# Patient Record
Sex: FEMALE | Race: WHITE | NOT HISPANIC OR LATINO | Employment: FULL TIME | URBAN - METROPOLITAN AREA
[De-identification: names, ages, dates, MRNs, and addresses within clinical notes are randomized per-mention and may not be internally consistent; named-entity substitution may affect disease eponyms.]

---

## 2017-07-10 ENCOUNTER — APPOINTMENT (OUTPATIENT)
Dept: WOMENS IMAGING | Facility: HOSPITAL | Age: 42
End: 2017-07-10

## 2017-07-10 PROCEDURE — 77067 SCR MAMMO BI INCL CAD: CPT | Performed by: RADIOLOGY

## 2019-04-15 ENCOUNTER — APPOINTMENT (OUTPATIENT)
Dept: WOMENS IMAGING | Facility: HOSPITAL | Age: 44
End: 2019-04-15

## 2019-04-15 PROCEDURE — 77063 BREAST TOMOSYNTHESIS BI: CPT | Performed by: RADIOLOGY

## 2019-04-15 PROCEDURE — 77067 SCR MAMMO BI INCL CAD: CPT | Performed by: RADIOLOGY

## 2019-10-29 ENCOUNTER — TRANSCRIBE ORDERS (OUTPATIENT)
Dept: ADMINISTRATIVE | Facility: HOSPITAL | Age: 44
End: 2019-10-29

## 2019-10-29 DIAGNOSIS — E07.89 HEMORRHAGE AND INFARCTION OF THYROID: Primary | ICD-10-CM

## 2020-06-01 ENCOUNTER — APPOINTMENT (OUTPATIENT)
Dept: WOMENS IMAGING | Facility: HOSPITAL | Age: 45
End: 2020-06-01

## 2020-06-01 PROCEDURE — 77067 SCR MAMMO BI INCL CAD: CPT | Performed by: RADIOLOGY

## 2020-06-01 PROCEDURE — 77063 BREAST TOMOSYNTHESIS BI: CPT | Performed by: RADIOLOGY

## 2020-10-13 ENCOUNTER — HOSPITAL ENCOUNTER (OUTPATIENT)
Dept: ULTRASOUND IMAGING | Facility: HOSPITAL | Age: 45
Discharge: HOME | End: 2020-10-13
Admitting: FAMILY MEDICINE

## 2020-10-13 DIAGNOSIS — E07.89 HEMORRHAGE AND INFARCTION OF THYROID: ICD-10-CM

## 2020-10-13 PROCEDURE — 76536 US EXAM OF HEAD AND NECK: CPT

## 2021-10-28 ENCOUNTER — TRANSCRIBE ORDERS (OUTPATIENT)
Dept: ADMINISTRATIVE | Facility: HOSPITAL | Age: 46
End: 2021-10-28

## 2021-10-28 DIAGNOSIS — E07.89 HEMORRHAGE AND INFARCTION OF THYROID: Primary | ICD-10-CM

## 2021-11-02 ENCOUNTER — APPOINTMENT (OUTPATIENT)
Dept: WOMENS IMAGING | Facility: HOSPITAL | Age: 46
End: 2021-11-02

## 2021-11-02 PROCEDURE — 77063 BREAST TOMOSYNTHESIS BI: CPT | Performed by: RADIOLOGY

## 2021-11-02 PROCEDURE — 77067 SCR MAMMO BI INCL CAD: CPT | Performed by: RADIOLOGY

## 2021-11-18 ENCOUNTER — HOSPITAL ENCOUNTER (OUTPATIENT)
Dept: ULTRASOUND IMAGING | Facility: HOSPITAL | Age: 46
Discharge: HOME | End: 2021-11-18
Admitting: FAMILY MEDICINE

## 2021-11-18 DIAGNOSIS — E07.89 HEMORRHAGE AND INFARCTION OF THYROID: ICD-10-CM

## 2021-11-18 PROCEDURE — 76536 US EXAM OF HEAD AND NECK: CPT

## 2022-04-12 ENCOUNTER — TRANSCRIBE ORDERS (OUTPATIENT)
Dept: ADMINISTRATIVE | Facility: HOSPITAL | Age: 47
End: 2022-04-12

## 2022-04-12 DIAGNOSIS — E07.89 OTHER SPECIFIED DISORDERS OF THYROID: Primary | ICD-10-CM

## 2022-04-19 ENCOUNTER — HOSPITAL ENCOUNTER (OUTPATIENT)
Dept: ULTRASOUND IMAGING | Facility: HOSPITAL | Age: 47
Discharge: HOME | End: 2022-04-19
Admitting: FAMILY MEDICINE

## 2022-04-19 DIAGNOSIS — E07.89 OTHER SPECIFIED DISORDERS OF THYROID: ICD-10-CM

## 2022-04-19 PROCEDURE — 76536 US EXAM OF HEAD AND NECK: CPT

## 2022-12-13 ENCOUNTER — APPOINTMENT (OUTPATIENT)
Dept: WOMENS IMAGING | Facility: HOSPITAL | Age: 47
End: 2022-12-13

## 2022-12-13 PROCEDURE — 77067 SCR MAMMO BI INCL CAD: CPT | Performed by: RADIOLOGY

## 2022-12-13 PROCEDURE — 77063 BREAST TOMOSYNTHESIS BI: CPT | Performed by: RADIOLOGY

## 2023-01-19 ENCOUNTER — APPOINTMENT (OUTPATIENT)
Dept: WOMENS IMAGING | Facility: HOSPITAL | Age: 48
End: 2023-01-19
Payer: COMMERCIAL

## 2023-01-19 PROCEDURE — 77065 DX MAMMO INCL CAD UNI: CPT | Performed by: RADIOLOGY

## 2023-01-19 PROCEDURE — 77061 BREAST TOMOSYNTHESIS UNI: CPT | Performed by: RADIOLOGY

## 2023-01-19 PROCEDURE — 76642 ULTRASOUND BREAST LIMITED: CPT | Performed by: RADIOLOGY

## 2023-01-19 PROCEDURE — G0279 TOMOSYNTHESIS, MAMMO: HCPCS | Performed by: RADIOLOGY

## 2023-01-24 ENCOUNTER — TRANSCRIBE ORDERS (OUTPATIENT)
Dept: GENERAL RADIOLOGY | Facility: HOSPITAL | Age: 48
End: 2023-01-24
Payer: COMMERCIAL

## 2023-02-20 ENCOUNTER — APPOINTMENT (OUTPATIENT)
Dept: WOMENS IMAGING | Facility: HOSPITAL | Age: 48
End: 2023-02-20
Payer: COMMERCIAL

## 2023-02-20 PROCEDURE — 19082 BX BREAST ADD LESION STRTCTC: CPT | Performed by: RADIOLOGY

## 2023-02-20 PROCEDURE — A4648 IMPLANTABLE TISSUE MARKER: HCPCS | Performed by: RADIOLOGY

## 2023-02-20 PROCEDURE — 19083 BX BREAST 1ST LESION US IMAG: CPT | Performed by: RADIOLOGY

## 2023-02-20 PROCEDURE — 19081 BX BREAST 1ST LESION STRTCTC: CPT | Performed by: RADIOLOGY

## 2023-02-21 DIAGNOSIS — C50.919 MALIGNANT NEOPLASM OF FEMALE BREAST, UNSPECIFIED ESTROGEN RECEPTOR STATUS, UNSPECIFIED LATERALITY, UNSPECIFIED SITE OF BREAST: Primary | ICD-10-CM

## 2023-02-22 ENCOUNTER — TELEPHONE (OUTPATIENT)
Dept: SURGERY | Facility: CLINIC | Age: 48
End: 2023-02-22
Payer: COMMERCIAL

## 2023-02-22 DIAGNOSIS — C50.912 MALIGNANT NEOPLASM OF LEFT FEMALE BREAST, UNSPECIFIED ESTROGEN RECEPTOR STATUS, UNSPECIFIED SITE OF BREAST: Primary | ICD-10-CM

## 2023-02-22 NOTE — TELEPHONE ENCOUNTER
ATTEMPTED TO CONTACT PT REGARDING MRI BREAST SCHEDULED ON 3/2 @ 5:45 PM, ARRIVAL 5:15 PM FOR [facility], [place], operated by [facility]. PT IS TO WEAR NO METALS FOR THE APPT. NEED TO GET PT'S WT. VM WAS FULL, UNABLE TO LM. WILL TRY AGAIN LATER.

## 2023-02-23 ENCOUNTER — TELEPHONE (OUTPATIENT)
Dept: SURGERY | Facility: CLINIC | Age: 48
End: 2023-02-23
Payer: COMMERCIAL

## 2023-02-23 NOTE — TELEPHONE ENCOUNTER
SPOKE TO PT REGARDING MRI BREAST SCHEDULED ON 3/2 @ 5:45 PM, ARRIVAL 5:15 PM FOR [facility]. INSTRUCTED PT TO WEAR NO METALS TO THE APPT.

## 2023-02-28 ENCOUNTER — OFFICE VISIT (OUTPATIENT)
Dept: SURGERY | Facility: CLINIC | Age: 48
End: 2023-02-28
Payer: COMMERCIAL

## 2023-02-28 ENCOUNTER — PATIENT OUTREACH (OUTPATIENT)
Dept: OTHER | Facility: HOSPITAL | Age: 48
End: 2023-02-28
Payer: COMMERCIAL

## 2023-02-28 VITALS — BODY MASS INDEX: 23.22 KG/M2 | WEIGHT: 136 LBS | HEIGHT: 64 IN

## 2023-02-28 DIAGNOSIS — D05.12 DUCTAL CARCINOMA IN SITU (DCIS) OF LEFT BREAST: Primary | ICD-10-CM

## 2023-02-28 PROCEDURE — 99204 OFFICE O/P NEW MOD 45 MIN: CPT | Performed by: STUDENT IN AN ORGANIZED HEALTH CARE EDUCATION/TRAINING PROGRAM

## 2023-02-28 RX ORDER — OMEPRAZOLE 20 MG/1
20 CAPSULE, DELAYED RELEASE ORAL DAILY
COMMUNITY
Start: 2023-01-30

## 2023-02-28 RX ORDER — LEVOTHYROXINE SODIUM 0.2 MG/1
200 TABLET ORAL DAILY
COMMUNITY
Start: 2023-02-25

## 2023-02-28 RX ORDER — LINACLOTIDE 290 UG/1
290 CAPSULE, GELATIN COATED ORAL DAILY
COMMUNITY
Start: 2022-12-06

## 2023-02-28 RX ORDER — ERGOCALCIFEROL 1.25 MG/1
50000 CAPSULE ORAL
COMMUNITY
Start: 2023-02-23

## 2023-02-28 RX ORDER — DEXTROAMPHETAMINE SACCHARATE, AMPHETAMINE ASPARTATE MONOHYDRATE, DEXTROAMPHETAMINE SULFATE AND AMPHETAMINE SULFATE 5; 5; 5; 5 MG/1; MG/1; MG/1; MG/1
20 CAPSULE, EXTENDED RELEASE ORAL EVERY MORNING
COMMUNITY
Start: 2023-02-05

## 2023-02-28 NOTE — PROGRESS NOTES
Referral received from Dr. Hickman's office. Met Ms. Zapata, her  and best friend during her surgery consult. I introduced myself and navigational services. She has a good understanding of her pathology and treatment options presented to her by Dr. Hickman and was able to verbalize teach back. After the consult she is leaning toward having a lumpectomy. She is comfortable with this plan and has no questions or concerns following the consult.      She stated she has a wonderful support system with her family and friends. She stated she feels comfortable talking to them about needs or issues.      She stated she has no financial or transportation concerns at this time. She has no resource needs or ongoing concerns at this time.      She stated she is doing ok emotionally at this time. We discussed we have support options if the need arises. She was thankful for the information.      We discussed integrative therapies and other [facility]. She received a navigation folder with the following information:     [facility], Cancer and Restorative Exercise (CARE), [employer] Exercise program, Guide for the Newly Diagnosed, [facility], [facility], Massage Therapy, Reiki Therapy, [employer] [place], Cancer Nutrition, [facility] and Survivorship [facility].     She verbalized appreciation for navigational services and she has my contact information and will call with any questions that arise.

## 2023-03-01 ENCOUNTER — PREP FOR SURGERY (OUTPATIENT)
Dept: OTHER | Facility: HOSPITAL | Age: 48
End: 2023-03-01
Payer: COMMERCIAL

## 2023-03-01 DIAGNOSIS — D05.12 DUCTAL CARCINOMA IN SITU (DCIS) OF LEFT BREAST: Primary | ICD-10-CM

## 2023-03-01 RX ORDER — DIAZEPAM 5 MG/1
5 TABLET ORAL ONCE
Status: CANCELLED | OUTPATIENT
Start: 2023-03-24 | End: 2023-03-01

## 2023-03-01 RX ORDER — CEFAZOLIN SODIUM 2 G/100ML
2 INJECTION, SOLUTION INTRAVENOUS ONCE
Status: CANCELLED | OUTPATIENT
Start: 2023-03-24 | End: 2023-03-01

## 2023-03-02 ENCOUNTER — HOSPITAL ENCOUNTER (OUTPATIENT)
Dept: MRI IMAGING | Facility: HOSPITAL | Age: 48
Discharge: HOME | End: 2023-03-02
Admitting: STUDENT IN AN ORGANIZED HEALTH CARE EDUCATION/TRAINING PROGRAM
Payer: COMMERCIAL

## 2023-03-02 DIAGNOSIS — C50.912 MALIGNANT NEOPLASM OF LEFT FEMALE BREAST, UNSPECIFIED ESTROGEN RECEPTOR STATUS, UNSPECIFIED SITE OF BREAST: ICD-10-CM

## 2023-03-02 PROCEDURE — A9577 INJ MULTIHANCE: HCPCS | Performed by: STUDENT IN AN ORGANIZED HEALTH CARE EDUCATION/TRAINING PROGRAM

## 2023-03-02 PROCEDURE — 0 GADOBENATE DIMEGLUMINE 529 MG/ML SOLUTION: Performed by: STUDENT IN AN ORGANIZED HEALTH CARE EDUCATION/TRAINING PROGRAM

## 2023-03-02 PROCEDURE — 77049 MRI BREAST C-+ W/CAD BI: CPT

## 2023-03-02 RX ADMIN — GADOBENATE DIMEGLUMINE 12 ML: 529 INJECTION, SOLUTION INTRAVENOUS at 17:54

## 2023-03-06 ENCOUNTER — TELEPHONE (OUTPATIENT)
Dept: SURGERY | Facility: CLINIC | Age: 48
End: 2023-03-06
Payer: COMMERCIAL

## 2023-03-06 NOTE — TELEPHONE ENCOUNTER
Called and left voicemail. No new findings. Ok to proceed with left breast wire localized lumpectomy. Will discuss when she calls back.    ER/IA-, Ki67 60%    IMPRESSION:  1. Biopsy-proven site of DCIS in the posterior one-third upper-outer quadrant of the left breast represented by the hourglass-shaped metallic clip. No suspicious enhancement is seen at this location or in other areas of the left breast. There is no evidence for left axillary adenopathy. If breast conservation therapy is desired then surgical excision of any remaining suspicious microcalcifications is recommended.  2. There are no findings suspicious for malignancy in the right breast.  BI-RADS Category 6: Known biopsy-proven malignancy.    Janny Hickman MD

## 2023-03-08 ENCOUNTER — TELEPHONE (OUTPATIENT)
Dept: SURGERY | Facility: CLINIC | Age: 48
End: 2023-03-08
Payer: COMMERCIAL

## 2023-03-10 ENCOUNTER — TELEPHONE (OUTPATIENT)
Dept: SURGERY | Facility: CLINIC | Age: 48
End: 2023-03-10
Payer: COMMERCIAL

## 2023-03-10 NOTE — TELEPHONE ENCOUNTER
Called to discuss MRI results.  No answer and no voicemail set up.  We will try again later.    Janny Hickman MD

## 2023-03-13 ENCOUNTER — TELEPHONE (OUTPATIENT)
Dept: SURGERY | Facility: CLINIC | Age: 48
End: 2023-03-13
Payer: COMMERCIAL

## 2023-03-16 ENCOUNTER — PATIENT OUTREACH (OUTPATIENT)
Dept: OTHER | Facility: HOSPITAL | Age: 48
End: 2023-03-16
Payer: COMMERCIAL

## 2023-03-16 NOTE — PROGRESS NOTES
Pt emailed questions about pre-op appts, and what to expect post op. Responded to her through email.

## 2023-03-17 ENCOUNTER — PRE-ADMISSION TESTING (OUTPATIENT)
Dept: PREADMISSION TESTING | Facility: HOSPITAL | Age: 48
End: 2023-03-17
Payer: COMMERCIAL

## 2023-03-17 VITALS
OXYGEN SATURATION: 100 % | HEART RATE: 82 BPM | WEIGHT: 139.6 LBS | SYSTOLIC BLOOD PRESSURE: 98 MMHG | DIASTOLIC BLOOD PRESSURE: 68 MMHG | BODY MASS INDEX: 23.83 KG/M2 | TEMPERATURE: 97.4 F | RESPIRATION RATE: 16 BRPM | HEIGHT: 64 IN

## 2023-03-17 LAB
ANION GAP SERPL CALCULATED.3IONS-SCNC: 6.2 MMOL/L (ref 5–15)
BUN SERPL-MCNC: 16 MG/DL (ref 6–20)
BUN/CREAT SERPL: 20.8 (ref 7–25)
CALCIUM SPEC-SCNC: 9.7 MG/DL (ref 8.6–10.5)
CHLORIDE SERPL-SCNC: 104 MMOL/L (ref 98–107)
CO2 SERPL-SCNC: 29.8 MMOL/L (ref 22–29)
CREAT SERPL-MCNC: 0.77 MG/DL (ref 0.57–1)
DEPRECATED RDW RBC AUTO: 37.8 FL (ref 37–54)
EGFRCR SERPLBLD CKD-EPI 2021: 95.3 ML/MIN/1.73
ERYTHROCYTE [DISTWIDTH] IN BLOOD BY AUTOMATED COUNT: 11.3 % (ref 12.3–15.4)
GLUCOSE SERPL-MCNC: 85 MG/DL (ref 65–99)
HCG SERPL QL: NEGATIVE
HCT VFR BLD AUTO: 39.7 % (ref 34–46.6)
HGB BLD-MCNC: 13.7 G/DL (ref 12–15.9)
MCH RBC QN AUTO: 31.3 PG (ref 26.6–33)
MCHC RBC AUTO-ENTMCNC: 34.5 G/DL (ref 31.5–35.7)
MCV RBC AUTO: 90.6 FL (ref 79–97)
PLATELET # BLD AUTO: 263 10*3/MM3 (ref 140–450)
PMV BLD AUTO: 10 FL (ref 6–12)
POTASSIUM SERPL-SCNC: 4.4 MMOL/L (ref 3.5–5.2)
RBC # BLD AUTO: 4.38 10*6/MM3 (ref 3.77–5.28)
SODIUM SERPL-SCNC: 140 MMOL/L (ref 136–145)
WBC NRBC COR # BLD: 4.94 10*3/MM3 (ref 3.4–10.8)

## 2023-03-17 PROCEDURE — 85027 COMPLETE CBC AUTOMATED: CPT

## 2023-03-17 PROCEDURE — 36415 COLL VENOUS BLD VENIPUNCTURE: CPT

## 2023-03-17 PROCEDURE — 80048 BASIC METABOLIC PNL TOTAL CA: CPT

## 2023-03-17 PROCEDURE — 84703 CHORIONIC GONADOTROPIN ASSAY: CPT

## 2023-03-17 RX ORDER — CHLORHEXIDINE GLUCONATE 500 MG/1
CLOTH TOPICAL
COMMUNITY

## 2023-03-17 RX ORDER — SPIRONOLACTONE 100 MG/1
100 TABLET, FILM COATED ORAL DAILY
COMMUNITY
Start: 2023-03-01

## 2023-03-17 NOTE — DISCHARGE INSTRUCTIONS
Take the following medications the morning of surgery:  LEVOTHYROXINE AND OMEPRAZOLE    HOLD ADDERALL 48 HOURS PRIOR TO OR     ARRIVAL TIME 0930 ON 3/24/23      If you are on prescription narcotic pain medication to control your pain you may also take that medication the morning of surgery.    General Instructions:  Do not eat solid food after midnight the night before surgery.  You may drink clear liquids day of surgery but must stop at least one hour before your hospital arrival time.  It is beneficial for you to have a clear drink that contains carbohydrates the day of surgery.  We suggest a 12 to 20 ounce bottle of Gatorade or Powerade for non-diabetic patients or a 12 to 20 ounce bottle of G2 or Powerade Zero for diabetic patients. (Pediatric patients, are not advised to drink a 12 to 20 ounce carbohydrate drink)    Clear liquids are liquids you can see through.  Nothing red in color.     Plain water                               Sports drinks  Sodas                                   Gelatin (Jell-O)  Fruit juices without pulp such as white grape juice and apple juice  Popsicles that contain no fruit or yogurt  Tea or coffee (no cream or milk added)  Gatorade / Powerade  G2 / Powerade Zero    Infants may have breast milk up to four hours before surgery.  Infants drinking formula may drink formula up to six hours before surgery.   Patients who avoid smoking, chewing tobacco and alcohol for 4 weeks prior to surgery have a reduced risk of post-operative complications.  Quit smoking as many days before surgery as you can.  Do not smoke, use chewing tobacco or drink alcohol the day of surgery.   If applicable bring your C-PAP/ BI-PAP machine.  Bring any papers given to you in the doctor’s office.  Wear clean comfortable clothes.  Do not wear contact lenses, false eyelashes or make-up.  Bring a case for your glasses.   Bring crutches or walker if applicable.  Remove all piercings.  Leave jewelry and any other  valuables at home.  Hair extensions with metal clips must be removed prior to surgery.  The Pre-Admission Testing nurse will instruct you to bring medications if unable to obtain an accurate list in Pre-Admission Testing.            Preventing a Surgical Site Infection:  For 2 to 3 days before surgery, avoid shaving with a razor because the razor can irritate skin and make it easier to develop an infection.    Any areas of open skin can increase the risk of a post-operative wound infection by allowing bacteria to enter and travel throughout the body.  Notify your surgeon if you have any skin wounds / rashes even if it is not near the expected surgical site.  The area will need assessed to determine if surgery should be delayed until it is healed.  The night prior to surgery shower using a fresh bar of anti-bacterial soap (such as Dial) and clean washcloth.  Sleep in a clean bed with clean clothing.  Do not allow pets to sleep with you.  Shower on the morning of surgery using a fresh bar of anti-bacterial soap (such as Dial) and clean washcloth.  Dry with a clean towel and dress in clean clothing.  Ask your surgeon if you will be receiving antibiotics prior to surgery.  Make sure you, your family, and all healthcare providers clean their hands with soap and water or an alcohol based hand  before caring for you or your wound.    Day of surgery:  Your arrival time is approximately two hours before your scheduled surgery time.  Upon arrival, a Pre-op nurse and Anesthesiologist will review your health history, obtain vital signs, and answer questions you may have.  The only belongings needed at this time will be a list of your home medications and if applicable your C-PAP/BI-PAP machine.  A Pre-op nurse will start an IV and you may receive medication in preparation for surgery, including something to help you relax.     Please be aware that surgery does come with discomfort.  We want to make every effort to  control your discomfort so please discuss any uncontrolled symptoms with your nurse.   Your doctor will most likely have prescribed pain medications.      If you are going home after surgery you will receive individualized written care instructions before being discharged.  A responsible adult must drive you to and from the hospital on the day of your surgery and stay with you for 24 hours.  Discharge prescriptions can be filled by the hospital pharmacy during regular pharmacy hours.  If you are having surgery late in the day/evening your prescription may be e-prescribed to your pharmacy.  Please verify your pharmacy hours or chose a 24 hour pharmacy to avoid not having access to your prescription because your pharmacy has closed for the day.    If you are staying overnight following surgery, you will be transported to your hospital room following the recovery period.  [facility] has all private rooms.    If you have any questions please call Pre-Admission Testing at [phone].  Deductibles and co-payments are collected on the day of service. Please be prepared to pay the required co-pay, deductible or deposit on the day of service as defined by your plan.    Call your surgeon immediately if you experience any of the following symptoms:  Sore Throat  Shortness of Breath or difficulty breathing  Cough  Chills  Body soreness or muscle pain  Headache  Fever  New loss of taste or smell  Do not arrive for your surgery ill.  Your procedure will need to be rescheduled to another time.  You will need to call your physician before the day of surgery to avoid any unnecessary exposure to hospital staff as well as other patients.

## 2023-03-20 ENCOUNTER — PREP FOR SURGERY (OUTPATIENT)
Dept: OTHER | Facility: HOSPITAL | Age: 48
End: 2023-03-20
Payer: COMMERCIAL

## 2023-03-24 ENCOUNTER — ANESTHESIA EVENT (OUTPATIENT)
Dept: PERIOP | Facility: HOSPITAL | Age: 48
End: 2023-03-24
Payer: COMMERCIAL

## 2023-03-24 ENCOUNTER — ANESTHESIA (OUTPATIENT)
Dept: PERIOP | Facility: HOSPITAL | Age: 48
End: 2023-03-24
Payer: COMMERCIAL

## 2023-03-24 ENCOUNTER — HOSPITAL ENCOUNTER (OUTPATIENT)
Facility: HOSPITAL | Age: 48
Setting detail: HOSPITAL OUTPATIENT SURGERY
Discharge: HOME | End: 2023-03-24
Attending: STUDENT IN AN ORGANIZED HEALTH CARE EDUCATION/TRAINING PROGRAM | Admitting: STUDENT IN AN ORGANIZED HEALTH CARE EDUCATION/TRAINING PROGRAM
Payer: COMMERCIAL

## 2023-03-24 ENCOUNTER — HOSPITAL ENCOUNTER (OUTPATIENT)
Dept: MAMMOGRAPHY | Facility: HOSPITAL | Age: 48
Discharge: HOME | End: 2023-03-24
Payer: COMMERCIAL

## 2023-03-24 ENCOUNTER — APPOINTMENT (OUTPATIENT)
Dept: GENERAL RADIOLOGY | Facility: HOSPITAL | Age: 48
End: 2023-03-24
Payer: COMMERCIAL

## 2023-03-24 VITALS
HEART RATE: 90 BPM | RESPIRATION RATE: 16 BRPM | TEMPERATURE: 98.2 F | OXYGEN SATURATION: 100 % | HEIGHT: 64 IN | SYSTOLIC BLOOD PRESSURE: 104 MMHG | BODY MASS INDEX: 23.96 KG/M2 | DIASTOLIC BLOOD PRESSURE: 67 MMHG

## 2023-03-24 DIAGNOSIS — D05.12 DUCTAL CARCINOMA IN SITU (DCIS) OF LEFT BREAST: ICD-10-CM

## 2023-03-24 DIAGNOSIS — D05.12 DUCTAL CARCINOMA IN SITU (DCIS) OF LEFT BREAST: Primary | ICD-10-CM

## 2023-03-24 LAB
B-HCG UR QL: NEGATIVE
EXPIRATION DATE: NORMAL
INTERNAL NEGATIVE CONTROL: NEGATIVE
INTERNAL POSITIVE CONTROL: POSITIVE
Lab: NORMAL

## 2023-03-24 PROCEDURE — 81025 URINE PREGNANCY TEST: CPT | Performed by: STUDENT IN AN ORGANIZED HEALTH CARE EDUCATION/TRAINING PROGRAM

## 2023-03-24 PROCEDURE — 19301 PARTIAL MASTECTOMY: CPT | Performed by: STUDENT IN AN ORGANIZED HEALTH CARE EDUCATION/TRAINING PROGRAM

## 2023-03-24 PROCEDURE — 25010000002 ONDANSETRON PER 1 MG: Performed by: NURSE ANESTHETIST, CERTIFIED REGISTERED

## 2023-03-24 PROCEDURE — 25010000002 CEFAZOLIN IN DEXTROSE 2-4 GM/100ML-% SOLUTION: Performed by: STUDENT IN AN ORGANIZED HEALTH CARE EDUCATION/TRAINING PROGRAM

## 2023-03-24 PROCEDURE — 25010000002 DEXAMETHASONE SODIUM PHOSPHATE 20 MG/5ML SOLUTION: Performed by: NURSE ANESTHETIST, CERTIFIED REGISTERED

## 2023-03-24 PROCEDURE — 25010000002 KETOROLAC TROMETHAMINE PER 15 MG: Performed by: NURSE ANESTHETIST, CERTIFIED REGISTERED

## 2023-03-24 PROCEDURE — C1819 TISSUE LOCALIZATION-EXCISION: HCPCS

## 2023-03-24 PROCEDURE — 25010000002 DROPERIDOL PER 5 MG: Performed by: NURSE ANESTHETIST, CERTIFIED REGISTERED

## 2023-03-24 PROCEDURE — 76098 X-RAY EXAM SURGICAL SPECIMEN: CPT

## 2023-03-24 PROCEDURE — 25010000002 PROPOFOL 10 MG/ML EMULSION: Performed by: NURSE ANESTHETIST, CERTIFIED REGISTERED

## 2023-03-24 PROCEDURE — 0 LIDOCAINE 1 % SOLUTION: Performed by: STUDENT IN AN ORGANIZED HEALTH CARE EDUCATION/TRAINING PROGRAM

## 2023-03-24 PROCEDURE — 25010000002 HYDROMORPHONE PER 4 MG: Performed by: NURSE ANESTHETIST, CERTIFIED REGISTERED

## 2023-03-24 PROCEDURE — 25010000002 FENTANYL CITRATE (PF) 50 MCG/ML SOLUTION: Performed by: NURSE ANESTHETIST, CERTIFIED REGISTERED

## 2023-03-24 PROCEDURE — 25010000002 MIDAZOLAM PER 1 MG: Performed by: STUDENT IN AN ORGANIZED HEALTH CARE EDUCATION/TRAINING PROGRAM

## 2023-03-24 PROCEDURE — 88307 TISSUE EXAM BY PATHOLOGIST: CPT | Performed by: STUDENT IN AN ORGANIZED HEALTH CARE EDUCATION/TRAINING PROGRAM

## 2023-03-24 DEVICE — LIGACLIP MCA MULTIPLE CLIP APPLIERS, 20 MEDIUM CLIPS
Type: IMPLANTABLE DEVICE | Site: BREAST | Status: FUNCTIONAL
Brand: LIGACLIP

## 2023-03-24 RX ORDER — HYDRALAZINE HYDROCHLORIDE 20 MG/ML
5 INJECTION INTRAMUSCULAR; INTRAVENOUS
Status: DISCONTINUED | OUTPATIENT
Start: 2023-03-24 | End: 2023-03-24 | Stop reason: HOSPADM

## 2023-03-24 RX ORDER — EPHEDRINE SULFATE 50 MG/ML
5 INJECTION, SOLUTION INTRAVENOUS ONCE AS NEEDED
Status: DISCONTINUED | OUTPATIENT
Start: 2023-03-24 | End: 2023-03-24 | Stop reason: HOSPADM

## 2023-03-24 RX ORDER — DROPERIDOL 2.5 MG/ML
0.62 INJECTION, SOLUTION INTRAMUSCULAR; INTRAVENOUS
Status: DISCONTINUED | OUTPATIENT
Start: 2023-03-24 | End: 2023-03-24 | Stop reason: HOSPADM

## 2023-03-24 RX ORDER — MIDAZOLAM HYDROCHLORIDE 1 MG/ML
1 INJECTION INTRAMUSCULAR; INTRAVENOUS
Status: COMPLETED | OUTPATIENT
Start: 2023-03-24 | End: 2023-03-24

## 2023-03-24 RX ORDER — PROPOFOL 10 MG/ML
VIAL (ML) INTRAVENOUS AS NEEDED
Status: DISCONTINUED | OUTPATIENT
Start: 2023-03-24 | End: 2023-03-24 | Stop reason: SURG

## 2023-03-24 RX ORDER — DIPHENHYDRAMINE HYDROCHLORIDE 50 MG/ML
12.5 INJECTION INTRAMUSCULAR; INTRAVENOUS
Status: DISCONTINUED | OUTPATIENT
Start: 2023-03-24 | End: 2023-03-24 | Stop reason: HOSPADM

## 2023-03-24 RX ORDER — NALOXONE HCL 0.4 MG/ML
0.2 VIAL (ML) INJECTION AS NEEDED
Status: DISCONTINUED | OUTPATIENT
Start: 2023-03-24 | End: 2023-03-24 | Stop reason: HOSPADM

## 2023-03-24 RX ORDER — HYDROCODONE BITARTRATE AND ACETAMINOPHEN 7.5; 325 MG/1; MG/1
1 TABLET ORAL ONCE AS NEEDED
Status: DISCONTINUED | OUTPATIENT
Start: 2023-03-24 | End: 2023-03-24 | Stop reason: HOSPADM

## 2023-03-24 RX ORDER — SODIUM CHLORIDE 0.9 % (FLUSH) 0.9 %
3 SYRINGE (ML) INJECTION EVERY 12 HOURS SCHEDULED
Status: DISCONTINUED | OUTPATIENT
Start: 2023-03-24 | End: 2023-03-24 | Stop reason: HOSPADM

## 2023-03-24 RX ORDER — FENTANYL CITRATE 50 UG/ML
INJECTION, SOLUTION INTRAMUSCULAR; INTRAVENOUS AS NEEDED
Status: DISCONTINUED | OUTPATIENT
Start: 2023-03-24 | End: 2023-03-24 | Stop reason: SURG

## 2023-03-24 RX ORDER — LABETALOL HYDROCHLORIDE 5 MG/ML
5 INJECTION, SOLUTION INTRAVENOUS
Status: DISCONTINUED | OUTPATIENT
Start: 2023-03-24 | End: 2023-03-24 | Stop reason: HOSPADM

## 2023-03-24 RX ORDER — DIAZEPAM 5 MG/1
5 TABLET ORAL ONCE
Status: COMPLETED | OUTPATIENT
Start: 2023-03-24 | End: 2023-03-24

## 2023-03-24 RX ORDER — ACETAMINOPHEN 325 MG/1
650 TABLET ORAL EVERY 6 HOURS PRN
Status: CANCELLED | OUTPATIENT
Start: 2023-03-24

## 2023-03-24 RX ORDER — BUPIVACAINE HYDROCHLORIDE AND EPINEPHRINE 5; 5 MG/ML; UG/ML
INJECTION, SOLUTION PERINEURAL AS NEEDED
Status: DISCONTINUED | OUTPATIENT
Start: 2023-03-24 | End: 2023-03-24 | Stop reason: HOSPADM

## 2023-03-24 RX ORDER — OXYCODONE AND ACETAMINOPHEN 7.5; 325 MG/1; MG/1
1 TABLET ORAL EVERY 4 HOURS PRN
Status: DISCONTINUED | OUTPATIENT
Start: 2023-03-24 | End: 2023-03-24 | Stop reason: HOSPADM

## 2023-03-24 RX ORDER — MAGNESIUM HYDROXIDE 1200 MG/15ML
LIQUID ORAL AS NEEDED
Status: DISCONTINUED | OUTPATIENT
Start: 2023-03-24 | End: 2023-03-24 | Stop reason: HOSPADM

## 2023-03-24 RX ORDER — SODIUM CHLORIDE 0.9 % (FLUSH) 0.9 %
3-10 SYRINGE (ML) INJECTION AS NEEDED
Status: DISCONTINUED | OUTPATIENT
Start: 2023-03-24 | End: 2023-03-24 | Stop reason: HOSPADM

## 2023-03-24 RX ORDER — LIDOCAINE HYDROCHLORIDE 10 MG/ML
3 INJECTION, SOLUTION INFILTRATION; PERINEURAL ONCE
Status: COMPLETED | OUTPATIENT
Start: 2023-03-24 | End: 2023-03-24

## 2023-03-24 RX ORDER — LIDOCAINE HYDROCHLORIDE 20 MG/ML
INJECTION, SOLUTION INFILTRATION; PERINEURAL AS NEEDED
Status: DISCONTINUED | OUTPATIENT
Start: 2023-03-24 | End: 2023-03-24 | Stop reason: SURG

## 2023-03-24 RX ORDER — HYDROMORPHONE HYDROCHLORIDE 1 MG/ML
0.5 INJECTION, SOLUTION INTRAMUSCULAR; INTRAVENOUS; SUBCUTANEOUS
Status: DISCONTINUED | OUTPATIENT
Start: 2023-03-24 | End: 2023-03-24 | Stop reason: HOSPADM

## 2023-03-24 RX ORDER — ONDANSETRON 2 MG/ML
INJECTION INTRAMUSCULAR; INTRAVENOUS AS NEEDED
Status: DISCONTINUED | OUTPATIENT
Start: 2023-03-24 | End: 2023-03-24 | Stop reason: SURG

## 2023-03-24 RX ORDER — CEFAZOLIN SODIUM 2 G/100ML
2 INJECTION, SOLUTION INTRAVENOUS ONCE
Status: COMPLETED | OUTPATIENT
Start: 2023-03-24 | End: 2023-03-24

## 2023-03-24 RX ORDER — SCOLOPAMINE TRANSDERMAL SYSTEM 1 MG/1
1 PATCH, EXTENDED RELEASE TRANSDERMAL ONCE
Status: DISCONTINUED | OUTPATIENT
Start: 2023-03-24 | End: 2023-03-24 | Stop reason: HOSPADM

## 2023-03-24 RX ORDER — PROMETHAZINE HYDROCHLORIDE 25 MG/1
25 SUPPOSITORY RECTAL ONCE AS NEEDED
Status: DISCONTINUED | OUTPATIENT
Start: 2023-03-24 | End: 2023-03-24 | Stop reason: HOSPADM

## 2023-03-24 RX ORDER — SODIUM CHLORIDE, SODIUM LACTATE, POTASSIUM CHLORIDE, CALCIUM CHLORIDE 600; 310; 30; 20 MG/100ML; MG/100ML; MG/100ML; MG/100ML
9 INJECTION, SOLUTION INTRAVENOUS CONTINUOUS
Status: DISCONTINUED | OUTPATIENT
Start: 2023-03-24 | End: 2023-03-24 | Stop reason: HOSPADM

## 2023-03-24 RX ORDER — DEXAMETHASONE SODIUM PHOSPHATE 4 MG/ML
INJECTION, SOLUTION INTRA-ARTICULAR; INTRALESIONAL; INTRAMUSCULAR; INTRAVENOUS; SOFT TISSUE AS NEEDED
Status: DISCONTINUED | OUTPATIENT
Start: 2023-03-24 | End: 2023-03-24 | Stop reason: SURG

## 2023-03-24 RX ORDER — KETOROLAC TROMETHAMINE 30 MG/ML
INJECTION, SOLUTION INTRAMUSCULAR; INTRAVENOUS AS NEEDED
Status: DISCONTINUED | OUTPATIENT
Start: 2023-03-24 | End: 2023-03-24 | Stop reason: SURG

## 2023-03-24 RX ORDER — ACETAMINOPHEN 500 MG
1000 TABLET ORAL ONCE
Status: COMPLETED | OUTPATIENT
Start: 2023-03-24 | End: 2023-03-24

## 2023-03-24 RX ORDER — IPRATROPIUM BROMIDE AND ALBUTEROL SULFATE 2.5; .5 MG/3ML; MG/3ML
3 SOLUTION RESPIRATORY (INHALATION) ONCE AS NEEDED
Status: DISCONTINUED | OUTPATIENT
Start: 2023-03-24 | End: 2023-03-24 | Stop reason: HOSPADM

## 2023-03-24 RX ORDER — PROMETHAZINE HYDROCHLORIDE 25 MG/1
25 TABLET ORAL ONCE AS NEEDED
Status: DISCONTINUED | OUTPATIENT
Start: 2023-03-24 | End: 2023-03-24 | Stop reason: HOSPADM

## 2023-03-24 RX ORDER — LIDOCAINE HYDROCHLORIDE 10 MG/ML
0.5 INJECTION, SOLUTION EPIDURAL; INFILTRATION; INTRACAUDAL; PERINEURAL ONCE AS NEEDED
Status: DISCONTINUED | OUTPATIENT
Start: 2023-03-24 | End: 2023-03-24 | Stop reason: HOSPADM

## 2023-03-24 RX ORDER — TRAMADOL HYDROCHLORIDE 50 MG/1
50 TABLET ORAL EVERY 6 HOURS PRN
Qty: 8 TABLET | Refills: 0 | Status: SHIPPED | OUTPATIENT
Start: 2023-03-24 | End: 2024-03-23

## 2023-03-24 RX ORDER — FLUMAZENIL 0.1 MG/ML
0.2 INJECTION INTRAVENOUS AS NEEDED
Status: DISCONTINUED | OUTPATIENT
Start: 2023-03-24 | End: 2023-03-24 | Stop reason: HOSPADM

## 2023-03-24 RX ORDER — FENTANYL CITRATE 50 UG/ML
50 INJECTION, SOLUTION INTRAMUSCULAR; INTRAVENOUS
Status: DISCONTINUED | OUTPATIENT
Start: 2023-03-24 | End: 2023-03-24 | Stop reason: HOSPADM

## 2023-03-24 RX ORDER — ONDANSETRON 2 MG/ML
4 INJECTION INTRAMUSCULAR; INTRAVENOUS ONCE AS NEEDED
Status: COMPLETED | OUTPATIENT
Start: 2023-03-24 | End: 2023-03-24

## 2023-03-24 RX ADMIN — ONDANSETRON 4 MG: 2 INJECTION INTRAMUSCULAR; INTRAVENOUS at 14:54

## 2023-03-24 RX ADMIN — PROPOFOL 200 MG: 10 INJECTION, EMULSION INTRAVENOUS at 13:36

## 2023-03-24 RX ADMIN — DEXAMETHASONE SODIUM PHOSPHATE 8 MG: 4 INJECTION, SOLUTION INTRAMUSCULAR; INTRAVENOUS at 13:39

## 2023-03-24 RX ADMIN — HYDROMORPHONE HYDROCHLORIDE 0.5 MG: 1 INJECTION, SOLUTION INTRAMUSCULAR; INTRAVENOUS; SUBCUTANEOUS at 14:55

## 2023-03-24 RX ADMIN — Medication 3 ML: at 11:55

## 2023-03-24 RX ADMIN — Medication 3 ML: at 11:52

## 2023-03-24 RX ADMIN — ACETAMINOPHEN 1000 MG: 500 TABLET ORAL at 10:31

## 2023-03-24 RX ADMIN — SODIUM CHLORIDE, POTASSIUM CHLORIDE, SODIUM LACTATE AND CALCIUM CHLORIDE 9 ML/HR: 600; 310; 30; 20 INJECTION, SOLUTION INTRAVENOUS at 10:32

## 2023-03-24 RX ADMIN — CEFAZOLIN SODIUM 2 G: 2 INJECTION, SOLUTION INTRAVENOUS at 13:46

## 2023-03-24 RX ADMIN — CEFAZOLIN SODIUM 2 G: 2 INJECTION, SOLUTION INTRAVENOUS at 13:27

## 2023-03-24 RX ADMIN — ONDANSETRON 4 MG: 2 INJECTION INTRAMUSCULAR; INTRAVENOUS at 14:23

## 2023-03-24 RX ADMIN — SODIUM CHLORIDE, POTASSIUM CHLORIDE, SODIUM LACTATE AND CALCIUM CHLORIDE 9 ML/HR: 600; 310; 30; 20 INJECTION, SOLUTION INTRAVENOUS at 12:59

## 2023-03-24 RX ADMIN — KETOROLAC TROMETHAMINE 30 MG: 30 INJECTION, SOLUTION INTRAMUSCULAR; INTRAVENOUS at 14:23

## 2023-03-24 RX ADMIN — DROPERIDOL 0.62 MG: 2.5 INJECTION, SOLUTION INTRAMUSCULAR; INTRAVENOUS at 15:08

## 2023-03-24 RX ADMIN — MIDAZOLAM 1 MG: 1 INJECTION INTRAMUSCULAR; INTRAVENOUS at 13:20

## 2023-03-24 RX ADMIN — FENTANYL CITRATE 50 MCG: 50 INJECTION, SOLUTION INTRAMUSCULAR; INTRAVENOUS at 13:45

## 2023-03-24 RX ADMIN — SCOPALAMINE 1 PATCH: 1 PATCH, EXTENDED RELEASE TRANSDERMAL at 10:29

## 2023-03-24 RX ADMIN — MIDAZOLAM 1 MG: 1 INJECTION INTRAMUSCULAR; INTRAVENOUS at 12:59

## 2023-03-24 RX ADMIN — DIAZEPAM 5 MG: 5 TABLET ORAL at 10:29

## 2023-03-24 RX ADMIN — LIDOCAINE HYDROCHLORIDE 60 MG: 20 INJECTION, SOLUTION INFILTRATION; PERINEURAL at 13:36

## 2023-03-24 NOTE — OP NOTE
OPERATIVE REPORT     DATE: 3/24/2023     SURGEON: Janny Hickman MD      OPERATION PERFORMED: Left breast wire localized lumpectomy     PREOPERATIVE DIAGNOSIS: ductal carcinoma in situ of the left breast      POSTOPERATIVE DIAGNOSIS: Same     ANESTHESIA: General     SPECIMEN:   Left breast wire localized lumpectomy (short stitch superior, long lateral, double anterior)  Additional anterior margin (stitch marks true margin)  Additional posterior margin (stitch marks true margin)  Additional medial margin (stitch marks true margin)  Additional lateral margin (stitch marks true margin)  Additional superior margin (stitch marks true margin)  Additional inferior margin (stitch marks true margin)    DRAINS: None     BLOOD LOSS: Minimal     INDICATION FOR OPERATION:Ariana Zapata is a 48 y.o. lady who was recently diagnosed with left breast DCIS. She ultimately elected to proceed with left breast wire localized lumpectomy. All risks (including bleeding, infection, damage to surrounding structures, need for further surgery, pathologic upgrade), benefits and alternatives were explained to the patient who agreed and wished to proceed. Informed consent was signed.      OPERATIVE COURSE: The patient was taken to the operating room, transferred onto the operating room table, and underwent anesthesia without incident. he patient was prepped and draped in sterile fashion. A time out was performed and preoperative antibiotics were given.  Half percent Marcaine with epinephrine was injected into the skin and subcutaneous tissues.  A curvilinear incision was made along the breast in the upper outer quadrant. Bovie electrocautery was used to create a flap along the length of the wires 1 cm in thickness.    The tissue was transected circumferentially around the 2 wires. Lastly the wire was brought through the incision with 2 hemostats.  The tissue was amputated at its base.  It was marked as listed above.  Specimen radiograph  confirmed Top-Hat clip, 2 wires, and abnormal breast tissue.  We took an image and additional posterior margin all the way to the pectoralis fascia which did have the hourglass clip in it. It was sent for fresh permanent specimen. Additional margins were taken along the remaining borders. These were done with Allis clamps and approximately 1 cm in thickness. The area was irrigated and appeared hemostatic.  There were no signs of bleeding.      The rest of the local anesthetic was administered. The incision was closed with interrupted 3-0 Vicryl sutures and a running 4-0 Monocryl suture.  Skin glue was placed over the incision.  The patient tolerated the procedure well. All needle and lap counts were correct at the end of the case. The patient was then awoken from anesthesia and taken to recovery for further monitoring.           Janny Hickman MD   General and Endoscopic Surgery  [facility]     [address], [address]  [place], [place], [address]  P: [phone]  F: [phone]

## 2023-03-24 NOTE — ANESTHESIA PROCEDURE NOTES
Airway  Urgency: elective    Date/Time: 3/24/2023 1:37 PM    General Information and Staff    Patient location during procedure: OR  Anesthesiologist: Kiley Uriarte MD  CRNA/CAA: Jung Bruno CRNA    Indications and Patient Condition  Indications for airway management: airway protection    Preoxygenated: yes  MILS maintained throughout  Mask difficulty assessment: 1 - vent by mask    Final Airway Details  Final airway type: supraglottic airway      Successful airway: unique  Size 4     Number of attempts at approach: 1  Assessment: lips, teeth, and gum same as pre-op and atraumatic intubation

## 2023-03-24 NOTE — ANESTHESIA POSTPROCEDURE EVALUATION
"Patient: Ariana Zapata    Procedure Summary     Date: 03/24/23 Room / Location: [facility] OR 04 / [facility] MAIN OR    Anesthesia Start: 1331 Anesthesia Stop: 1442    Procedure: left breast wire localized lumpectomy (Left: Breast) Diagnosis:       Ductal carcinoma in situ (DCIS) of left breast      (Ductal carcinoma in situ (DCIS) of left breast [D05.12])    Surgeons: Janny Hickman MD Provider: Kiley Uriarte MD    Anesthesia Type: MAC ASA Status: 2          Anesthesia Type: MAC    Vitals  Vitals Value Taken Time   /67 03/24/23 1516   Temp 36.8 °C (98.2 °F) 03/24/23 1439   Pulse 65 03/24/23 1523   Resp 14 03/24/23 1515   SpO2 99 % 03/24/23 1524   Vitals shown include unvalidated device data.        Post Anesthesia Care and Evaluation    Patient location during evaluation: bedside  Patient participation: complete - patient participated  Level of consciousness: awake and alert  Pain management: adequate    Airway patency: patent  Anesthetic complications: No anesthetic complications    Cardiovascular status: acceptable  Respiratory status: acceptable  Hydration status: acceptable    Comments: BP 95/62 (BP Location: Right arm, Patient Position: Lying)   Pulse 71   Temp 36.8 °C (98.2 °F) (Oral)   Resp 16   Ht 162.6 cm (64\")   SpO2 99%   BMI 23.96 kg/m²       "

## 2023-03-24 NOTE — ANESTHESIA PREPROCEDURE EVALUATION
Anesthesia Evaluation     Patient summary reviewed and Nursing notes reviewed   history of anesthetic complications: PONV  NPO Solid Status: > 8 hours  NPO Liquid Status: > 2 hours           Airway   Mallampati: II  TM distance: >3 FB  Neck ROM: full  Dental      Pulmonary    Cardiovascular         Neuro/Psych  GI/Hepatic/Renal/Endo    (+)  GERD,  thyroid problem hypothyroidism    Musculoskeletal     Abdominal    Substance History      OB/GYN          Other      history of cancer                    Anesthesia Plan    ASA 2     MAC     intravenous induction     Anesthetic plan, risks, benefits, and alternatives have been provided, discussed and informed consent has been obtained with: patient.        CODE STATUS:

## 2023-03-27 ENCOUNTER — PATIENT OUTREACH (OUTPATIENT)
Dept: OTHER | Facility: HOSPITAL | Age: 48
End: 2023-03-27
Payer: COMMERCIAL

## 2023-03-27 LAB
CYTO UR: NORMAL
LAB AP CASE REPORT: NORMAL
LAB AP DIAGNOSIS COMMENT: NORMAL
LAB AP INTRADEPARTMENTAL CONSULT: NORMAL
LAB AP SYNOPTIC CHECKLIST: NORMAL
PATH REPORT.FINAL DX SPEC: NORMAL
PATH REPORT.GROSS SPEC: NORMAL

## 2023-03-27 NOTE — PROGRESS NOTES
Ariana emailed a form for her insurance. Forwarded email to Dr. Hickman's office and asked that she follow up with me later this week.

## 2023-03-28 ENCOUNTER — TELEPHONE (OUTPATIENT)
Dept: SURGERY | Facility: CLINIC | Age: 48
End: 2023-03-28
Payer: COMMERCIAL

## 2023-03-28 DIAGNOSIS — D05.10 DUCTAL CARCINOMA IN SITU (DCIS) OF BREAST, UNSPECIFIED LATERALITY: Primary | ICD-10-CM

## 2023-03-28 NOTE — TELEPHONE ENCOUNTER
Called Ariana Zapata regarding recent surgical pathology.    Left breast DCIS, grade III, margins negative, ER-/KS-   [id number]    Referral placed for medical oncology, radiation oncology.   Follow up in two weeks for wound check and further cancer surveillance planning.     Janny Hickman MD

## 2023-04-11 NOTE — PROGRESS NOTES
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    HISTORY OF PRESENT ILLNESS:  The patient is a 48 y.o. year old female who is here for an opinion about the above issue.    History of Present Illness patient is a 48-year-old female with history of attention deficit disorder and hypothyroidism and irritable bowel syndrome was noted on her most recent mammogram to have an abnormality in the left breast that warranted further diagnostic imaging.Patient had 3 areas of abnormality which on ultrasound showed a 6 x 10 mm mass in the upper outer quadrant of the left breast as well as atypical calcifications and the patient underwent biopsy of 3 separate areas in the left breast only 1 of which was abnormal showing high-grade DCIS measuring 1.5 mm ER/VT negative with a high Ki-67 of 65%.    Patient underwent bilateral breast MRIs which were fairly unremarkable except for the biopsy site and then proceeded with lumpectomy On 3/24/2023 at which time there was 1.1mm residual DCIS in the breast  with clear margins    She is here today to discuss prevention    Patient reports a previous right-sided lumpectomy many years ago which was reportedly benign and we do not have these path reports available    She is  0 para 0 and underwent 4-5 trials of in vitro which were nonsuccessful  Menarche was at age 12 menopause not yet achieved her last menstrual cycle was in October but after stopping her hormone replacement she had a.  Menstrual period 2 weeks ago    She has been on progesterone plus testosterone through her gynecologist for the last  7 to 8 years and since stopping this month ago she is feeling extremely tired and fatigued sleeps all the time and has no energy and feels like she is in a brain fog-she is wanting to know when she can go back on her hormone therapy    Family history is positive for mother with breast cancer at age 42 she is alive and well many years later her father has lung cancer related to smoking there is possible family history of colon cancer    She has never had a DVT heart attack or stroke  She is on fairly high doses of Synthroid for hypothyroidism and I do not see that her thyroid functions have been checked since 2021    She has been on Adderall for many years at a dose of 20 mg for her ADD        Past Medical History:   Diagnosis Date   • ADD (attention deficit disorder)    • Breast cancer, left 03/2023   • GERD (gastroesophageal reflux disease)    • Hypothyroidism    • IBS (irritable bowel syndrome)    • PONV (postoperative nausea and vomiting)         Past Surgical History:   Procedure Laterality Date   • BREAST BIOPSY Left 02/20/2023   • BREAST CYST EXCISION Right     BENIGN   • BREAST LUMPECTOMY Left 3/24/2023    Procedure: left breast wire localized lumpectomy;  Surgeon: Janny Hickman MD;  Location: [facility];  Service: General;  Laterality: Left;   • CHOLECYSTECTOMY N/A 01/01/1993   • COLONOSCOPY     • D & C HYSTEROSCOPY N/A 04/16/2012    followed by a laparoscopy with destruction of endometriotic lesions in the cul-de-sac and on t he posterior uterine wall with chromotubation and the fallopian tubes we open bilaterally   • MAMMO STEREOTACTIC BREAST BIOPSY 1ST W WO DEVICE Left 02/20/2023    abnormal   • OVARIAN CYST REMOVAL N/A 04/01/2012   • SINUS SURGERY          Current Outpatient Medications on File Prior to Visit   Medication Sig Dispense Refill   • amphetamine-dextroamphetamine XR (ADDERALL XR) 20 MG 24 hr capsule Take 1 capsule by mouth Every Morning TO HOLD 2 DAYS PRIOR TO OR     • levothyroxine  (SYNTHROID, LEVOTHROID) 200 MCG tablet Take 1 tablet by mouth Daily.     • Linzess 290 MCG capsule capsule Take 1 capsule by mouth Daily.     • omeprazole (priLOSEC) 20 MG capsule Take 1 capsule by mouth Daily.     • Probiotic Product capsule Take 1 tablet by mouth Daily.     • spironolactone (ALDACTONE) 100 MG tablet Take 1 tablet by mouth Daily.     • vitamin D (ERGOCALCIFEROL) 1.25 MG (67891 UT) capsule capsule Take 1 capsule by mouth Every 7 (Seven) Days. TUESDAYS     • Chlorhexidine Gluconate Cloth 2 % pads Apply  topically. PRIOR TO OR (Patient not taking: Reported on 4/12/2023)     • traMADol (Ultram) 50 MG tablet Take 1 tablet by mouth Every 6 (Six) Hours As Needed for Moderate Pain. (Patient not taking: Reported on 4/12/2023) 8 tablet 0     No current facility-administered medications on file prior to visit.        ALLERGIES:    Allergies   Allergen Reactions   • Amoxicillin-Pot Clavulanate Nausea And Vomiting   • Codeine Nausea And Vomiting        Social History     Socioeconomic History   • Marital status:      Spouse name: Chavez   • Number of children: 0   Tobacco Use   • Smoking status: Never   • Smokeless tobacco: Never   Substance and Sexual Activity   • Alcohol use: Yes     Comment: social   • Drug use: Never   • Sexual activity: Defer        Family History   Problem Relation Age of Onset   • Heart disease Mother    • Breast cancer Mother         mid 40s   • Seizures Mother    • Stroke Mother    • Diabetes Father    • Lung cancer Father    • Hyperlipidemia Paternal Grandmother    • Stroke Paternal Grandmother    • Breast cancer Paternal Great-Grandmother    • Malig Hyperthermia Neg Hx         Review of Systems   Constitutional: Positive for fatigue (Extremely sleepy since stopping hormones).   Psychiatric/Behavioral: Positive for decreased concentration (Since stopping hormones).   All other systems reviewed and are negative.       Objective     Vitals:    04/12/23 1306   BP: 97/66   Pulse: 82  "  Resp: 16   Temp: 98 °F (36.7 °C)   TempSrc: Temporal   SpO2: 100%   Weight: 60.9 kg (134 lb 3.2 oz)   Height: 162.6 cm (64.02\")   PainSc: 0-No pain         4/12/2023    12:48 PM   Current Status   ECOG score 0       Physical Exam    CONSTITUTIONAL:  Vital signs reviewed.  No distress, looks comfortable.  EYES:  Conjunctivae and lids unremarkable.  PERRLA  EARS,NOSE,MOUTH,THROAT:  Ears and nose appear unremarkable.  Lips, teeth, gums appear unremarkable.  RESPIRATORY:  Normal respiratory effort.  Lungs clear to auscultation bilaterally.  BREASTS: Right breast is benign with a lumpectomy scar at the upper quadrant at 12:00 left breast shows a incision in the upper outer quadrant well-healed  CARDIOVASCULAR:  Normal S1, S2.  No murmurs rubs or gallops.  No significant lower extremity edema.  GASTROINTESTINAL: Abdomen appears unremarkable.  Nontender.  No hepatomegaly.  No splenomegaly.  LYMPHATIC:  No cervical, supraclavicular, axillary lymphadenopathy.  SKIN:  Warm.  No rashes.  PSYCHIATRIC:  Normal judgment and insight.  Normal mood and affect.      RECENT LABS:  Hematology WBC   Date Value Ref Range Status   04/12/2023 7.23 3.40 - 10.80 10*3/mm3 Final   05/18/2021 5.15 4.5 - 11.0 10*3/uL Final     RBC   Date Value Ref Range Status   04/12/2023 4.42 3.77 - 5.28 10*6/mm3 Final   05/18/2021 4.07 4.0 - 5.2 10*6/uL Final     Hemoglobin   Date Value Ref Range Status   04/12/2023 13.8 12.0 - 15.9 g/dL Final   05/18/2021 12.8 12.0 - 16.0 g/dL Final     Hematocrit   Date Value Ref Range Status   04/12/2023 39.3 34.0 - 46.6 % Final   05/18/2021 38.9 36.0 - 46.0 % Final     Platelets   Date Value Ref Range Status   04/12/2023 252 140 - 450 10*3/mm3 Final   05/18/2021 266 140 - 440 10*3/uL Final      SPECIMEN  Procedure Excision (less than total mastectomy)  Specimen Laterality Left  .  TUMOR  Tumor Site Upper outer quadrant  Histologic Type Ductal carcinoma in situ  Size (Extent) of DCIS Estimated size (extent) of DCIS is at " least (Millimeters): 1.5 (greatest  size in core biopsy) mm  Architectural Patterns Cribriform  Solid  Nuclear Grade Grade III (high)  Necrosis Present, focal (small foci or single cell necrosis)  Microcalcifications Not identified  .  MARGINS  Margin Status All margins negative for DCIS  Distance from DCIS to Closest Margin 2 mm  Closest Margin(s) to DCIS Posterior  .  REGIONAL LYMPH NODES  Regional Lymph Node Status Not applicable (no regional lymph nodes submitted or found)  .  PATHOLOGIC STAGE CLASSIFICATION (pTNM, AJCC 8th Edition)  Reporting of pT, pN, and (when applicable) pM categories is based on information available to the pathologist at the time the report is issued. As  per the AJCC (Chapter 1, 8th Ed.) it is the managing physician’s responsibility to establish the final pathologic stage based upon all pertinent  information, including but potentially not limited to this pathology report.  pT Category pTis (DCIS)  pN Category pN not assigned (no nodes submitted or found)  .  Breast Biomarker Testing Performed on Previous Biopsy  Testing Performed on Case Number Outside OPUS [id number], report not available for review  .ER/NE 0        Assessment & Plan   1.pTis Nx high-grade DCIS left breast ER/NE negative post lumpectomy  · Radiation appointment scheduled  · Currently off her hormone therapy with progesterone and testosterone which she wants very badly to resume because of fatigue loss of concentration and excessive sleepiness    2.  Hypothyroidism on treatment    3.  ADHD on Adderall 20 mg XR daily    Plan  1.  Proceed with radiation therapy  2.  Check thyroid profile  3.  47 gene extended panel to be reflexed from her original genetic testing  4.  Discussed with gynecologist going back on progesterone only and slowly tapering off over the next 6 months  5.  Return in 6 months for follow-up and we will discuss further options.    I explained to Ariana that although her DCIS was ER/NE negative her family  history with her mother having a hormone positive breast cancer at age 42 is very concerning despite the negative genetic testing and we would like as much as possible to get her off her hormonal therapy as long as she can tolerate    She understands and will work with her gynecologist to see if she can wean off the progesterone and hold off on the testosterone    I spent 60 total minutes, face-to-face, caring for Ariana today.  Greater than 50% of this time involved counseling and/or coordination of care as documented within this note.

## 2023-04-12 ENCOUNTER — LAB (OUTPATIENT)
Dept: LAB | Facility: HOSPITAL | Age: 48
End: 2023-04-12
Payer: COMMERCIAL

## 2023-04-12 ENCOUNTER — CONSULT (OUTPATIENT)
Dept: ONCOLOGY | Facility: CLINIC | Age: 48
End: 2023-04-12
Payer: COMMERCIAL

## 2023-04-12 ENCOUNTER — OFFICE VISIT (OUTPATIENT)
Dept: SURGERY | Facility: CLINIC | Age: 48
End: 2023-04-12
Payer: COMMERCIAL

## 2023-04-12 ENCOUNTER — TELEPHONE (OUTPATIENT)
Dept: RADIATION ONCOLOGY | Facility: HOSPITAL | Age: 48
End: 2023-04-12
Payer: COMMERCIAL

## 2023-04-12 VITALS — BODY MASS INDEX: 22.88 KG/M2 | WEIGHT: 134 LBS | HEIGHT: 64 IN

## 2023-04-12 VITALS
OXYGEN SATURATION: 100 % | RESPIRATION RATE: 16 BRPM | BODY MASS INDEX: 22.91 KG/M2 | WEIGHT: 134.2 LBS | DIASTOLIC BLOOD PRESSURE: 66 MMHG | TEMPERATURE: 98 F | HEIGHT: 64 IN | HEART RATE: 82 BPM | SYSTOLIC BLOOD PRESSURE: 97 MMHG

## 2023-04-12 DIAGNOSIS — D05.12 DUCTAL CARCINOMA IN SITU (DCIS) OF LEFT BREAST: Primary | ICD-10-CM

## 2023-04-12 DIAGNOSIS — C50.919 MALIGNANT NEOPLASM OF FEMALE BREAST, UNSPECIFIED ESTROGEN RECEPTOR STATUS, UNSPECIFIED LATERALITY, UNSPECIFIED SITE OF BREAST: Primary | ICD-10-CM

## 2023-04-12 LAB
BASOPHILS # BLD AUTO: 0.02 10*3/MM3 (ref 0–0.2)
BASOPHILS NFR BLD AUTO: 0.3 % (ref 0–1.5)
DEPRECATED RDW RBC AUTO: 37.5 FL (ref 37–54)
EOSINOPHIL # BLD AUTO: 0.09 10*3/MM3 (ref 0–0.4)
EOSINOPHIL NFR BLD AUTO: 1.2 % (ref 0.3–6.2)
ERYTHROCYTE [DISTWIDTH] IN BLOOD BY AUTOMATED COUNT: 11.7 % (ref 12.3–15.4)
HCT VFR BLD AUTO: 39.3 % (ref 34–46.6)
HGB BLD-MCNC: 13.8 G/DL (ref 12–15.9)
IMM GRANULOCYTES # BLD AUTO: 0.01 10*3/MM3 (ref 0–0.05)
IMM GRANULOCYTES NFR BLD AUTO: 0.1 % (ref 0–0.5)
LYMPHOCYTES # BLD AUTO: 1.8 10*3/MM3 (ref 0.7–3.1)
LYMPHOCYTES NFR BLD AUTO: 24.9 % (ref 19.6–45.3)
MCH RBC QN AUTO: 31.2 PG (ref 26.6–33)
MCHC RBC AUTO-ENTMCNC: 35.1 G/DL (ref 31.5–35.7)
MCV RBC AUTO: 88.9 FL (ref 79–97)
MONOCYTES # BLD AUTO: 0.58 10*3/MM3 (ref 0.1–0.9)
MONOCYTES NFR BLD AUTO: 8 % (ref 5–12)
NEUTROPHILS NFR BLD AUTO: 4.73 10*3/MM3 (ref 1.7–7)
NEUTROPHILS NFR BLD AUTO: 65.5 % (ref 42.7–76)
NRBC BLD AUTO-RTO: 0 /100 WBC (ref 0–0.2)
PLATELET # BLD AUTO: 252 10*3/MM3 (ref 140–450)
PMV BLD AUTO: 10.2 FL (ref 6–12)
RBC # BLD AUTO: 4.42 10*6/MM3 (ref 3.77–5.28)
T4 FREE SERPL-MCNC: 3.68 NG/DL (ref 0.93–1.7)
TSH SERPL DL<=0.05 MIU/L-ACNC: 0.01 UIU/ML (ref 0.27–4.2)
WBC NRBC COR # BLD: 7.23 10*3/MM3 (ref 3.4–10.8)

## 2023-04-12 PROCEDURE — 85025 COMPLETE CBC W/AUTO DIFF WBC: CPT

## 2023-04-12 PROCEDURE — 36415 COLL VENOUS BLD VENIPUNCTURE: CPT

## 2023-04-12 PROCEDURE — 99205 OFFICE O/P NEW HI 60 MIN: CPT | Performed by: INTERNAL MEDICINE

## 2023-04-12 PROCEDURE — 99024 POSTOP FOLLOW-UP VISIT: CPT | Performed by: STUDENT IN AN ORGANIZED HEALTH CARE EDUCATION/TRAINING PROGRAM

## 2023-04-12 PROCEDURE — 84443 ASSAY THYROID STIM HORMONE: CPT | Performed by: INTERNAL MEDICINE

## 2023-04-12 PROCEDURE — 84439 ASSAY OF FREE THYROXINE: CPT | Performed by: INTERNAL MEDICINE

## 2023-04-12 NOTE — PROGRESS NOTES
General Surgery Breast Cancer History and Physical Exam     Summary:    Ariana Zapata is a 48 y.o. 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      A multidisciplinary plan has been formulated for the patient:    (1) Breast Surgical Oncology:  -[employer] 9 panel genetic testing: negative   -S/p left breast wire localized lumpectomy 3/2023.   [facility] Will [facility] if no improvement.   -Annual mammogram 12/2023.   -Follow up 1/2024.    (2) Medical Oncology:  -Appointment with Dr. Tse today. No plans for endocrine therapy.    (3) Radiation Oncology:  -Appointment with Dr. Andrade tomorrow.    Referring Provider: No ref. provider found    Chief Complaint: abnormal breast imaging    History of Present Illness: Ms. Ariana Zapata is a 48 y.o. year old lady, seen at the request of No ref. provider found for a new diagnosis of left breast cancer.      This was initially detected as an imaging abnormality. She has had annual mammograms each year. She had a prior right breast biopsy in [facility] (2004) that was benign. She then had another breast biopsy on the [facility] that was benign. Her work-up is detailed in the oncologic history below.     She denies any  breast lumps, pain, skin changes, or nipple discharge. She has a family history of breast cancer in a great grandmother and her mother at age 42. She denies any family history of ovarian cancer.     4/12/2023 she presents today for follow-up.  She is overall doing well.  Her pain from surgery has subsided but she does complain of some pain in her inframammary line far from her incision site.  She is complaining of quite a bit of fatigue that she believes is related to stopping her hormone therapy.    Workup of Current Diagnosis:    12/13/2022 bilateral Screening Mammogram:  Finding 1: There are new calcifications seen in the posterior one third upper outer region of the left  breast.  Require additional evaluation.  Finding 2: There is a focal asymmetry seen in the upper outer region of the left breast.  Requires additional evaluation.  BI-RADS Category 0    1/19/2023 left breast diagnostic Mammogram with Ultrasound:   Finding 1: On present exam there is a focal asymmetry in the upper outer region of the left breast 5 cm from the nipple.  On ultrasound there is an irregular parallel hypoechoic mass with angular margins measuring 6 x 7 x 10 mm in the upper outer region of the left breast 5 cm from the nipple.  Suspicious.  Ultrasound-guided biopsy is recommended.  Finding 2: On present exam there are 3 groups of coarse heterogeneous calcifications with grouped distribution in the posterior one third upper outer region of the left breast.  Suspicious.  Stereotactic biopsy is recommended.  2 site stereotactic biopsy is recommended.  BI-RADS Category 4B    2/20/2023 left breast ultrasound-guided biopsy:   Left breast at 1:00 was imaged and the mass was localized.  15 cores were obtained.  A mini cork tissue marker was placed.  Clip was in the expected position.  Pathology returned benign and concordant.    2/20/2023 left breast stereotactic biopsy:  The left breast in the upper outer quadrant was isolated and the calcifications were localized.  12 core specimens were obtained.  A Top-Hat clip was placed.  Clip was in the expected position.  Pathology is benign and concordant    2/20/2023 Pathology:   1. Site A, left breast, upper outer quadrant, stereotactic biopsy:   Benign fibrofatty breast tissue  2.  Site B, left breast, upper outer quadrant, stereotactic biopsy:  DCIS, grade 3  3.  Mass, left breast tissue at 1:00, upper outer quadrant, ultrasound biopsy:  Benign fibrofatty breast tissue    3/2/2023 Bilateral Breast MRI   IMPRESSION:  1. Biopsy-proven site of DCIS in the posterior one-third upper-outer quadrant of the left breast represented by the hourglass-shaped metallic clip. No  suspicious enhancement is seen at this location or in other areas of the left breast. There is no evidence for left axillary adenopathy. If breast conservation therapy is desired then surgical excision of any remaining suspicious microcalcifications is recommended.  2. There are no findings suspicious for malignancy in the right breast.  BI-RADS Category 6: Known biopsy-proven malignancy.    3/24/2023 Left breast wire localized lumpectomy  Final Diagnosis   1. Breast, Left, Lumpectomy (10 grams):               A. Benign breast parenchyma with fibroadenomatoid hyperplasia.               B. Top hat clip and biopsy site change identified.      2. Breast, Left Additional Superior Margin, Inked and Oriented Excision:               A. Benign fibroadipose tissue.     3. Breast, Left Additional Medial Margin, Inked and Oriented Excision:                 A. Benign breast parenchyma with fibroadenomatoid change.     4. Breast, Left Additional Lateral Margin, Inked and Oriented Excision:               A. Benign breast parenchyma.     5. Breast, Left Additional Inferior Margin, Inked and Oriented Excision:               A. Benign breast parenchyma with ductal hyperplasia of the usual type.     6. Breast, Left Additional Posterior Margin, Inked and Oriented Excision:               A. High grade ductal carcinoma in-situ (DCIS), 1.1 mm maximally, margin free. See synoptic template.               B. Hourglass clip and biopsy site change identified.               C. Clustered microcysts, columnar cell change and ductal hyperplasia of the usual type.               D. Scattered microcalcifications identified.               E. Benign skeletal muscle.     7. Breast, Left Additional Anterior Margin, Inked and Oriented Excision:               A. Benign breast parenchyma with fibroadenomatoid change.     Gynecologic History:   . P:0 AB:0  Age at first childbirth: N/A  Lactation/How long: N/A  Age at menarche: [age]  Age at menopause:  45  Total years of oral contraceptive use: 20 years previously  Total years of hormone replacement therapy: none    Past Medical History:   • Hypothyroidism   • GERD    Past Surgical History:    • Colonoscopy up to date  • Cholecystectomy   • D&C  • Ovarian cystectomy    Family History:    • As above    Social History:  • Denies tobacco use  • Occasional alcohol use    Allergies:   Allergies   Allergen Reactions   • Amoxicillin-Pot Clavulanate Nausea And Vomiting   • Codeine Nausea And Vomiting     Medications:     Current Outpatient Medications:   •  amphetamine-dextroamphetamine XR (ADDERALL XR) 20 MG 24 hr capsule, Take 1 capsule by mouth Every Morning TO HOLD 2 DAYS PRIOR TO OR, Disp: , Rfl:   •  Chlorhexidine Gluconate Cloth 2 % pads, Apply  topically. PRIOR TO OR, Disp: , Rfl:   •  levothyroxine (SYNTHROID, LEVOTHROID) 200 MCG tablet, Take 1 tablet by mouth Daily., Disp: , Rfl:   •  Linzess 290 MCG capsule capsule, Take 1 capsule by mouth Daily., Disp: , Rfl:   •  omeprazole (priLOSEC) 20 MG capsule, Take 1 capsule by mouth Daily., Disp: , Rfl:   •  Probiotic Product capsule, Take 1 tablet by mouth Daily., Disp: , Rfl:   •  spironolactone (ALDACTONE) 100 MG tablet, Take 1 tablet by mouth Daily., Disp: , Rfl:   •  traMADol (Ultram) 50 MG tablet, Take 1 tablet by mouth Every 6 (Six) Hours As Needed for Moderate Pain., Disp: 8 tablet, Rfl: 0  •  vitamin D (ERGOCALCIFEROL) 1.25 MG (87696 UT) capsule capsule, Take 1 capsule by mouth Every 7 (Seven) Days. TUESDAYS, Disp: , Rfl:     Laboratory Values:    Labs from 3/2023 reviewed    Review of Systems:   Influenza-like illness: no fever, no  cough, no  sore throat, no  body aches, no loss of sense of taste or smell, no known exposure to person with Covid-19.  Constitutional: Negative for fevers or chills  HENT: Negative for hearing loss or runny nose  Eyes: Negative for vision changes or scleral icterus  Respiratory: Negative for cough or shortness of  breath  Cardiovascular: Negative for chest pain or heart palpitations  Gastrointestinal: Negative for abdominal pain, nausea, vomiting, constipation, melena, or hematochezia  Genitourinary: Negative for hematuria or dysuria  Musculoskeletal: Negative for joint swelling or gait instability  Neurologic: Negative for tremors or seizures  Psychiatric: Negative for suicidal ideations or depression  All other systems reviewed and negative    Physical Exam:   • ECO - Asymptomatic  • Constitutional: Well-developed well-nourished, no acute distress  • Eyes: Conjunctiva normal, sclera nonicteric  • ENMT: Hearing grossly normal, oral mucosa moist  • Neck: Supple, no palpable mass, trachea midline  • Respiratory: Clear to auscultation, normal inspiratory effort  • Cardiovascular: Regular rate, no peripheral edema, no jugular venous distention  • Breast: symmetric,  o Right: No visible abnormalities on inspection while seated, with arms raised or hands on hips. No masses, skin changes, or nipple abnormalities.  o Left: No visible abnormalities on inspection while seated, with arms raised or hands on hips. No masses, skin changes, or nipple abnormalities. Left upper outer quadrant breast incision clean and dry, no erythema and drainage. Slight swelling at mid-lateral inframmary line.  o No clinical chest wall involvement.  • Gastrointestinal: Soft, nontender  • Lymphatics (palpable nodes): No cervical, supraclavicular or axillary lymphadenopathy  • Skin:  Warm, dry, no rash on visualized skin surfaces  • Musculoskeletal: Symmetric strength, normal gait  • Psychiatric: Alert and oriented ×3, normal affect     GERARD PINEDA M.D.  General and Endoscopic Surgery  [facility]    [address], [address]  [place], [place], [facility] [place]  P: [phone]  F: [phone]

## 2023-04-13 ENCOUNTER — CONSULT (OUTPATIENT)
Dept: RADIATION ONCOLOGY | Facility: HOSPITAL | Age: 48
End: 2023-04-13
Payer: COMMERCIAL

## 2023-04-13 ENCOUNTER — PATIENT ROUNDING (BHMG ONLY) (OUTPATIENT)
Dept: ONCOLOGY | Facility: CLINIC | Age: 48
End: 2023-04-13
Payer: COMMERCIAL

## 2023-04-13 ENCOUNTER — HOSPITAL ENCOUNTER (OUTPATIENT)
Dept: RADIATION ONCOLOGY | Facility: HOSPITAL | Age: 48
Setting detail: RADIATION/ONCOLOGY SERIES
End: 2023-04-13
Payer: COMMERCIAL

## 2023-04-13 VITALS
WEIGHT: 133 LBS | DIASTOLIC BLOOD PRESSURE: 68 MMHG | SYSTOLIC BLOOD PRESSURE: 111 MMHG | BODY MASS INDEX: 22.83 KG/M2 | OXYGEN SATURATION: 100 % | HEART RATE: 93 BPM

## 2023-04-13 DIAGNOSIS — D05.10 DUCTAL CARCINOMA IN SITU (DCIS) OF BREAST, UNSPECIFIED LATERALITY: ICD-10-CM

## 2023-04-13 PROCEDURE — G0463 HOSPITAL OUTPT CLINIC VISIT: HCPCS | Performed by: STUDENT IN AN ORGANIZED HEALTH CARE EDUCATION/TRAINING PROGRAM

## 2023-04-13 PROCEDURE — 99205 OFFICE O/P NEW HI 60 MIN: CPT | Performed by: STUDENT IN AN ORGANIZED HEALTH CARE EDUCATION/TRAINING PROGRAM

## 2023-04-13 NOTE — PROGRESS NOTES
A My-Chart message has been sent to the patient for PATIENT ROUNDING with [facility].

## 2023-04-13 NOTE — PROGRESS NOTES
[facility], [place] Radiation Oncology   Consult    Chief Complaint  DCIS Left Breast      Diagnosis: DCIS Left Breast    Overall Stage 0    pTis: per lumpectomy pathology  cN0: per physical exam, mammography, and MRI breast  cM0: per physical exam      Pacemaker: no  Prior History of Radiation: no  Contraindications to Radiation: no  Patient Requires Pregnancy Test: Yes      HPI:    Ariana Zapata is a 48 y.o. female with a screening detected left breast abnormality found in December 2022.  Diagnostic mammogram showed a mass in the left breast which was suspicious.  The patient underwent biopsy of the left breast at 3 sites in the left upper outer quadrant, one returning high-grade DCIS, ER/IA negative.  The patient underwent MRI of the breast which did not show any additional lesions.  The patient was taken for lumpectomy by Dr. Hickman on 3/24/2023.  She was found to have 1.1 mm of high-grade DCIS, margins negative, closest 2 mm posteriorly.  There was necrosis present.  The patient is not a candidate for endocrine therapy as she is ER/IA negative.  She was referred for consideration of adjuvant radiation therapy.        Imaging:      Screening Mammogram 12/13/2022          Diagnostic Mammogram 1/19/2023          MRI Breast 3/2/2023    IMPRESSION:  1. Biopsy-proven site of DCIS in the posterior one-third upper-outer  quadrant of the left breast represented by the hourglass-shaped metallic  clip. No suspicious enhancement is seen at this location or in other  areas of the left breast. There is no evidence for left axillary  adenopathy. If breast conservation therapy is desired then surgical  excision of any remaining suspicious microcalcifications is recommended.  2. There are no findings suspicious for malignancy in the right breast.        Pathology:      Left Breast Stereotactic Biopsy Pathology 2/20/2023      ER/IA-        Left Breast Lumpectomy Pathology 3/24/2023    Final Diagnosis   1. Breast, Left, Lumpectomy (10  grams):               A. Benign breast parenchyma with fibroadenomatoid hyperplasia.               B. Top hat clip and biopsy site change identified.      2. Breast, Left Additional Superior Margin, Inked and Oriented Excision:               A. Benign fibroadipose tissue.     3. Breast, Left Additional Medial Margin, Inked and Oriented Excision:                 A. Benign breast parenchyma with fibroadenomatoid change.     4. Breast, Left Additional Lateral Margin, Inked and Oriented Excision:               A. Benign breast parenchyma.     5. Breast, Left Additional Inferior Margin, Inked and Oriented Excision:               A. Benign breast parenchyma with ductal hyperplasia of the usual type.     6. Breast, Left Additional Posterior Margin, Inked and Oriented Excision:               A. High grade ductal carcinoma in-situ (DCIS), 1.1 mm maximally, margin free. See synoptic template.               B. Hourglass clip and biopsy site change identified.               C. Clustered microcysts, columnar cell change and ductal hyperplasia of the usual type.               D. Scattered microcalcifications identified.               E. Benign skeletal muscle.     7. Breast, Left Additional Anterior Margin, Inked and Oriented Excision:               A. Benign breast parenchyma with fibroadenomatoid change.       SPECIMEN   Procedure  Excision (less than total mastectomy)    Specimen Laterality  Left    TUMOR   Tumor Site  Upper outer quadrant    Histologic Type  Ductal carcinoma in situ    Size (Extent) of DCIS  Estimated size (extent) of DCIS is at least (Millimeters): 1.5 (greatest size in core biopsy) mm   Architectural Patterns  Cribriform      Solid    Nuclear Grade  Grade III (high)    Necrosis  Present, focal (small foci or single cell necrosis)    Microcalcifications  Not identified    MARGINS   Margin Status  All margins negative for DCIS    Distance from DCIS to Closest Margin  2 mm   Closest Margin(s) to DCIS   Posterior    REGIONAL LYMPH NODES   Regional Lymph Node Status  Not applicable (no regional lymph nodes submitted or found)    PATHOLOGIC STAGE CLASSIFICATION (pTNM, AJCC 8th Edition)   Reporting of pT, pN, and (when applicable) pM categories is based on information available to the pathologist at the time the report is issued. As per the AJCC (Chapter 1, 8th Ed.) it is the managing physician’s responsibility to establish the final pathologic stage based upon all pertinent information, including but potentially not limited to this pathology report.   pT Category  pTis (DCIS)          Labs:    Lab Results   Component Value Date    CREATININE 0.77 03/17/2023             Problem List:  Patient Active Problem List   Diagnosis   • Ductal carcinoma in situ (DCIS) of left breast          Medications:  Current Outpatient Medications on File Prior to Visit   Medication Sig Dispense Refill   • amphetamine-dextroamphetamine XR (ADDERALL XR) 20 MG 24 hr capsule Take 1 capsule by mouth Every Morning TO HOLD 2 DAYS PRIOR TO OR     • levothyroxine (SYNTHROID, LEVOTHROID) 200 MCG tablet Take 1 tablet by mouth Daily.     • Linzess 290 MCG capsule capsule Take 1 capsule by mouth Daily.     • omeprazole (priLOSEC) 20 MG capsule Take 1 capsule by mouth Daily.     • Probiotic Product capsule Take 1 tablet by mouth Daily.     • spironolactone (ALDACTONE) 100 MG tablet Take 1 tablet by mouth Daily.     • vitamin D (ERGOCALCIFEROL) 1.25 MG (41031 UT) capsule capsule Take 1 capsule by mouth Every 7 (Seven) Days. TUESDAYS       No current facility-administered medications on file prior to visit.          Allergies:  Allergies   Allergen Reactions   • Amoxicillin-Pot Clavulanate Nausea And Vomiting   • Codeine Nausea And Vomiting         Family History:  The patient has no family history of conditions which would be contraindications to radiation therapy      Social History:  Never Smoker    Distance From Clinic: <30 minutes    Patient has  "someone who can assist with transportation: yes      Review of Systems:    Review of Systems   Constitutional: Negative for chills, fever and unexpected weight change.   HENT: Negative for sore throat and trouble swallowing.    Eyes: Negative for photophobia and visual disturbance.   Respiratory: Negative for cough, chest tightness and shortness of breath.    Cardiovascular: Negative for chest pain and leg swelling.   Gastrointestinal: Negative for abdominal pain, constipation, diarrhea, nausea and vomiting.   Endocrine: Negative for cold intolerance and heat intolerance.   Genitourinary: Negative for difficulty urinating, dysuria and frequency.   Musculoskeletal: Negative for back pain, joint swelling and myalgias.   Skin: Negative for rash.   Neurological: Negative for dizziness, seizures, syncope, facial asymmetry, speech difficulty, weakness, light-headedness, numbness and headaches.   Hematological: Negative for adenopathy.   Psychiatric/Behavioral: Negative for confusion and suicidal ideas.   All other systems reviewed and are negative.        Vital Signs:  /68   Pulse 93   Wt 60.3 kg (133 lb)   SpO2 100%   BMI 22.83 kg/m²   Estimated body mass index is 22.83 kg/m² as calculated from the following:    Height as of 4/12/23: 162.6 cm (64\").    Weight as of this encounter: 60.3 kg (133 lb).  Pain Score    04/13/23 1032   PainSc: 0-No pain         ECOG: Fully active, able to carry on all pre-disease performance without restriction = 0    Physical Exam  Vitals reviewed.   Constitutional:       General: She is not in acute distress.     Appearance: Normal appearance.   HENT:      Head: Normocephalic and atraumatic.   Eyes:      Extraocular Movements: Extraocular movements intact.      Pupils: Pupils are equal, round, and reactive to light.   Pulmonary:      Effort: Pulmonary effort is normal.      Breath sounds: Normal breath sounds.   Abdominal:      General: Abdomen is flat.      Palpations: Abdomen is " soft.   Musculoskeletal:      Cervical back: Normal range of motion and neck supple.   Skin:     General: Skin is warm and dry.      Comments: Well-healing lumpectomy scar   Neurological:      General: No focal deficit present.      Mental Status: She is alert and oriented to person, place, and time.   Psychiatric:         Mood and Affect: Mood normal.         Behavior: Behavior normal.            Result Review :  The following data was reviewed by: Chet Andrade MD on 04/13/2023:  Labs: Last Creatinine   Data reviewed: Radiologic studies Mammogram, MRI Breast            Diagnoses and all orders for this visit:    1. Ductal carcinoma in situ (DCIS) of breast, unspecified laterality        Assessment:    Ariana Zapata is a 48 y.o. female with a screening detected left breast abnormality found in December 2022.  Diagnostic mammogram showed a mass in the left breast which was suspicious.  The patient underwent biopsy of the left breast at 3 sites in the left upper outer quadrant, one returning high-grade DCIS, ER/NJ negative.  The patient underwent MRI of the breast which did not show any additional lesions.  The patient was taken for lumpectomy by Dr. Hickman on 3/24/2023.  She was found to have 1.1 mm of high-grade DCIS, margins negative, closest 2 mm posteriorly.  There was necrosis present.  The patient is not a candidate for endocrine therapy as she is ER/NJ negative.  She was referred for consideration of adjuvant radiation therapy.    I met with the patient in consultation and discussed the risks, benefits, side effects, and logistics of radiation treatment planning and delivery.  I answered all the patient's questions to her satisfaction.  While her DCIS was small, it has a number of high risk features such as necrosis, high-grade, ER/NJ negative, her young age, and she also has a family history of breast cancer.  I recommended adjuvant whole breast radiation with a lumpectomy cavity boost.  At the end of our  conversation the patient wished to pursue radiation therapy.  Consents were signed.  Plan for CT simulation 4/18/2023      Plan:    -Plan for adjuvant whole breast radiation with a lumpectomy cavity boost for high risk DCIS.  Plan for CT simulation 4/18/2023       I spent 60 minutes caring for Ariana on this date of service. This time includes time spent by me in the following activities:preparing for the visit, reviewing tests, obtaining and/or reviewing a separately obtained history, documenting information in the medical record, independently interpreting results and communicating that information with the patient/family/caregiver and care coordination  Follow Up   No follow-ups on file.  Patient was given instructions and counseling regarding her condition or for health maintenance advice. Please see specific information pulled into the AVS if appropriate.     Chet Andrade MD

## 2023-04-18 ENCOUNTER — TELEPHONE (OUTPATIENT)
Dept: OTHER | Facility: HOSPITAL | Age: 48
End: 2023-04-18
Payer: COMMERCIAL

## 2023-04-18 PROCEDURE — 77290 THER RAD SIMULAJ FIELD CPLX: CPT | Performed by: STUDENT IN AN ORGANIZED HEALTH CARE EDUCATION/TRAINING PROGRAM

## 2023-04-18 PROCEDURE — 77263 THER RADIOLOGY TX PLNG CPLX: CPT | Performed by: STUDENT IN AN ORGANIZED HEALTH CARE EDUCATION/TRAINING PROGRAM

## 2023-04-18 PROCEDURE — 77333 RADIATION TREATMENT AID(S): CPT | Performed by: STUDENT IN AN ORGANIZED HEALTH CARE EDUCATION/TRAINING PROGRAM

## 2023-04-18 NOTE — TELEPHONE ENCOUNTER
Oncology Social Work.  ..Distress Screening Follow-up    Diagnosis:  DCIS Left Breast   Treatment : S/P lumpectomy.  To undergo radiation therapy     Location of Distress Screening: Radiation Oncology    Distress Level: 5 (4/13/2023 10:37 AM)    Physical Concerns:    Fatigue: Y  Sleep: Y    Practical Problems:    Emotional Concerns:    Family Concerns:        Interventions:     Emotional Needs: emotional suppport/coping strategies  Physical Needs: outpatient rehab (information given [facility])    Comments:    Spoke to patient via phone. Explained role of OSW and services we can assist with. Mailed my contact information along with info on [facility] for cancer related fatigue.  Encouraged support services through [employer] and FFDELIA Chavez LCSW

## 2023-04-19 PROCEDURE — 77334 RADIATION TREATMENT AID(S): CPT | Performed by: STUDENT IN AN ORGANIZED HEALTH CARE EDUCATION/TRAINING PROGRAM

## 2023-04-19 PROCEDURE — 77300 RADIATION THERAPY DOSE PLAN: CPT | Performed by: STUDENT IN AN ORGANIZED HEALTH CARE EDUCATION/TRAINING PROGRAM

## 2023-04-19 PROCEDURE — 77295 3-D RADIOTHERAPY PLAN: CPT | Performed by: STUDENT IN AN ORGANIZED HEALTH CARE EDUCATION/TRAINING PROGRAM

## 2023-04-20 ENCOUNTER — PATIENT OUTREACH (OUTPATIENT)
Dept: OTHER | Facility: HOSPITAL | Age: 48
End: 2023-04-20
Payer: COMMERCIAL

## 2023-04-20 NOTE — PROGRESS NOTES
Called MsDanae Jodi to see how she was doing. Left a message with my contact information and asked her to call back at her convenience.

## 2023-04-24 ENCOUNTER — RADIATION ONCOLOGY WEEKLY ASSESSMENT (OUTPATIENT)
Dept: RADIATION ONCOLOGY | Facility: HOSPITAL | Age: 48
End: 2023-04-24
Payer: COMMERCIAL

## 2023-04-24 ENCOUNTER — HOSPITAL ENCOUNTER (OUTPATIENT)
Dept: RADIATION ONCOLOGY | Facility: HOSPITAL | Age: 48
Discharge: HOME | End: 2023-04-24

## 2023-04-24 VITALS — OXYGEN SATURATION: 100 % | DIASTOLIC BLOOD PRESSURE: 66 MMHG | SYSTOLIC BLOOD PRESSURE: 110 MMHG | HEART RATE: 82 BPM

## 2023-04-24 DIAGNOSIS — D05.12 DUCTAL CARCINOMA IN SITU (DCIS) OF LEFT BREAST: Primary | ICD-10-CM

## 2023-04-24 LAB
RAD ONC ARIA COURSE ID: NORMAL
RAD ONC ARIA COURSE INTENT: NORMAL
RAD ONC ARIA COURSE LAST TREATMENT DATE: NORMAL
RAD ONC ARIA COURSE START DATE: NORMAL
RAD ONC ARIA COURSE TREATMENT ELAPSED DAYS: 0
RAD ONC ARIA FIRST TREATMENT DATE: NORMAL
RAD ONC ARIA PLAN FRACTIONS TREATED TO DATE: 1
RAD ONC ARIA PLAN ID: NORMAL
RAD ONC ARIA PLAN PRESCRIBED DOSE PER FRACTION: 2.65 GY
RAD ONC ARIA PLAN PRIMARY REFERENCE POINT: NORMAL
RAD ONC ARIA PLAN TOTAL FRACTIONS PRESCRIBED: 16
RAD ONC ARIA PLAN TOTAL PRESCRIBED DOSE: 4240 CGY
RAD ONC ARIA REFERENCE POINT DOSAGE GIVEN TO DATE: 2.65 GY
RAD ONC ARIA REFERENCE POINT DOSAGE GIVEN TO DATE: 2.69 GY
RAD ONC ARIA REFERENCE POINT ID: NORMAL
RAD ONC ARIA REFERENCE POINT ID: NORMAL
RAD ONC ARIA REFERENCE POINT SESSION DOSAGE GIVEN: 2.65 GY
RAD ONC ARIA REFERENCE POINT SESSION DOSAGE GIVEN: 2.69 GY

## 2023-04-24 PROCEDURE — 77280 THER RAD SIMULAJ FIELD SMPL: CPT | Performed by: STUDENT IN AN ORGANIZED HEALTH CARE EDUCATION/TRAINING PROGRAM

## 2023-04-24 PROCEDURE — 77412 RADIATION TX DELIVERY LVL 3: CPT | Performed by: STUDENT IN AN ORGANIZED HEALTH CARE EDUCATION/TRAINING PROGRAM

## 2023-04-24 PROCEDURE — 77387 GUIDANCE FOR RADJ TX DLVR: CPT | Performed by: STUDENT IN AN ORGANIZED HEALTH CARE EDUCATION/TRAINING PROGRAM

## 2023-04-24 NOTE — PROGRESS NOTES
Radiation Oncology  On-Treatment Note      Patient: Ariana Zapata    MRN: [id number]    Attending Physician: Chet Andrade MD     Diagnosis:     ICD-10-CM ICD-9-CM   1. Ductal carcinoma in situ (DCIS) of left breast  D05.12 233.0       Radiation Therapy Visit:  Continue radiation therapy, Dosimetry plan remains acceptable, Films reviewed and remains acceptable, Pain assessed, Pain management planned, Radiation dose schedule reviewed and remains acceptable, Radiation technique remains acceptable and Symptoms within expected range    Radiation Treatments     Active   Plans   L Breast_FiF_   Most recent treatment: Dose planned: 265 cGy (fraction 1 on 4/24/2023)   Total: Dose planned: 4,240 cGy (16 fractions)   Elapsed Days: 0      Reference Points   Rx: L Breast   Most recent treatment: Dose given: 265 cGy (on 4/24/2023)   Total: Dose given: 265 cGy   Elapsed Days: 0      calc L Breast   Most recent treatment: Dose given: 269 cGy (on 4/24/2023)   Total: Dose given: 269 cGy   Elapsed Days: 0                    Physical Examination:  Vitals: Blood pressure 110/66, pulse 82, SpO2 100 %.  Vitals:    04/24/23 1611   BP: 110/66   Pulse: 82   SpO2: 100%     Pain Score    04/24/23 1611   PainSc: 0-No pain       Fully active, able to carry on all pre-disease performance without restriction = 0    We examined the relevant areas: yes  Findings are within the expected range for this stage of treatment: yes  -------------------------------------------------------------------------------------------------------------------    ACTION ITEMS:  Patient tolerating treatment well and as expected for this stage in their treatment and Continue radiation therapy as planned    Estimated Completion Date: 5/19/2023      Chet Andrade MD  Radiation Oncology

## 2023-04-25 ENCOUNTER — HOSPITAL ENCOUNTER (OUTPATIENT)
Dept: RADIATION ONCOLOGY | Facility: HOSPITAL | Age: 48
Discharge: HOME | End: 2023-04-25

## 2023-04-25 LAB
RAD ONC ARIA COURSE ID: NORMAL
RAD ONC ARIA COURSE INTENT: NORMAL
RAD ONC ARIA COURSE LAST TREATMENT DATE: NORMAL
RAD ONC ARIA COURSE START DATE: NORMAL
RAD ONC ARIA COURSE TREATMENT ELAPSED DAYS: 1
RAD ONC ARIA FIRST TREATMENT DATE: NORMAL
RAD ONC ARIA PLAN FRACTIONS TREATED TO DATE: 2
RAD ONC ARIA PLAN ID: NORMAL
RAD ONC ARIA PLAN PRESCRIBED DOSE PER FRACTION: 2.65 GY
RAD ONC ARIA PLAN PRIMARY REFERENCE POINT: NORMAL
RAD ONC ARIA PLAN TOTAL FRACTIONS PRESCRIBED: 16
RAD ONC ARIA PLAN TOTAL PRESCRIBED DOSE: 4240 CGY
RAD ONC ARIA REFERENCE POINT DOSAGE GIVEN TO DATE: 5.3 GY
RAD ONC ARIA REFERENCE POINT DOSAGE GIVEN TO DATE: 5.38 GY
RAD ONC ARIA REFERENCE POINT ID: NORMAL
RAD ONC ARIA REFERENCE POINT ID: NORMAL
RAD ONC ARIA REFERENCE POINT SESSION DOSAGE GIVEN: 2.65 GY
RAD ONC ARIA REFERENCE POINT SESSION DOSAGE GIVEN: 2.69 GY

## 2023-04-25 PROCEDURE — 77387 GUIDANCE FOR RADJ TX DLVR: CPT | Performed by: STUDENT IN AN ORGANIZED HEALTH CARE EDUCATION/TRAINING PROGRAM

## 2023-04-25 PROCEDURE — 77412 RADIATION TX DELIVERY LVL 3: CPT | Performed by: STUDENT IN AN ORGANIZED HEALTH CARE EDUCATION/TRAINING PROGRAM

## 2023-04-27 ENCOUNTER — HOSPITAL ENCOUNTER (OUTPATIENT)
Dept: RADIATION ONCOLOGY | Facility: HOSPITAL | Age: 48
Discharge: HOME | End: 2023-04-27

## 2023-04-27 DIAGNOSIS — D05.12 DUCTAL CARCINOMA IN SITU (DCIS) OF LEFT BREAST: Primary | ICD-10-CM

## 2023-04-27 LAB
RAD ONC ARIA COURSE ID: NORMAL
RAD ONC ARIA COURSE INTENT: NORMAL
RAD ONC ARIA COURSE LAST TREATMENT DATE: NORMAL
RAD ONC ARIA COURSE START DATE: NORMAL
RAD ONC ARIA COURSE TREATMENT ELAPSED DAYS: 3
RAD ONC ARIA FIRST TREATMENT DATE: NORMAL
RAD ONC ARIA PLAN FRACTIONS TREATED TO DATE: 3
RAD ONC ARIA PLAN ID: NORMAL
RAD ONC ARIA PLAN PRESCRIBED DOSE PER FRACTION: 2.65 GY
RAD ONC ARIA PLAN PRIMARY REFERENCE POINT: NORMAL
RAD ONC ARIA PLAN TOTAL FRACTIONS PRESCRIBED: 16
RAD ONC ARIA PLAN TOTAL PRESCRIBED DOSE: 4240 CGY
RAD ONC ARIA REFERENCE POINT DOSAGE GIVEN TO DATE: 7.95 GY
RAD ONC ARIA REFERENCE POINT DOSAGE GIVEN TO DATE: 8.06 GY
RAD ONC ARIA REFERENCE POINT ID: NORMAL
RAD ONC ARIA REFERENCE POINT ID: NORMAL
RAD ONC ARIA REFERENCE POINT SESSION DOSAGE GIVEN: 2.65 GY
RAD ONC ARIA REFERENCE POINT SESSION DOSAGE GIVEN: 2.69 GY

## 2023-04-27 PROCEDURE — G6002 STEREOSCOPIC X-RAY GUIDANCE: HCPCS | Performed by: STUDENT IN AN ORGANIZED HEALTH CARE EDUCATION/TRAINING PROGRAM

## 2023-04-27 PROCEDURE — 77412 RADIATION TX DELIVERY LVL 3: CPT | Performed by: STUDENT IN AN ORGANIZED HEALTH CARE EDUCATION/TRAINING PROGRAM

## 2023-04-27 PROCEDURE — 77336 RADIATION PHYSICS CONSULT: CPT | Performed by: STUDENT IN AN ORGANIZED HEALTH CARE EDUCATION/TRAINING PROGRAM

## 2023-04-27 PROCEDURE — 77387 GUIDANCE FOR RADJ TX DLVR: CPT | Performed by: STUDENT IN AN ORGANIZED HEALTH CARE EDUCATION/TRAINING PROGRAM

## 2023-04-27 RX ORDER — ONDANSETRON 4 MG/1
4 TABLET, ORALLY DISINTEGRATING ORAL AS NEEDED
Qty: 30 TABLET | Refills: 0 | Status: SHIPPED | OUTPATIENT
Start: 2023-04-27

## 2023-04-28 ENCOUNTER — HOSPITAL ENCOUNTER (OUTPATIENT)
Dept: RADIATION ONCOLOGY | Facility: HOSPITAL | Age: 48
Discharge: HOME | End: 2023-04-28

## 2023-04-28 LAB
RAD ONC ARIA COURSE ID: NORMAL
RAD ONC ARIA COURSE INTENT: NORMAL
RAD ONC ARIA COURSE LAST TREATMENT DATE: NORMAL
RAD ONC ARIA COURSE START DATE: NORMAL
RAD ONC ARIA COURSE TREATMENT ELAPSED DAYS: 4
RAD ONC ARIA FIRST TREATMENT DATE: NORMAL
RAD ONC ARIA PLAN FRACTIONS TREATED TO DATE: 4
RAD ONC ARIA PLAN ID: NORMAL
RAD ONC ARIA PLAN PRESCRIBED DOSE PER FRACTION: 2.65 GY
RAD ONC ARIA PLAN PRIMARY REFERENCE POINT: NORMAL
RAD ONC ARIA PLAN TOTAL FRACTIONS PRESCRIBED: 16
RAD ONC ARIA PLAN TOTAL PRESCRIBED DOSE: 4240 CGY
RAD ONC ARIA REFERENCE POINT DOSAGE GIVEN TO DATE: 10.6 GY
RAD ONC ARIA REFERENCE POINT DOSAGE GIVEN TO DATE: 10.75 GY
RAD ONC ARIA REFERENCE POINT ID: NORMAL
RAD ONC ARIA REFERENCE POINT ID: NORMAL
RAD ONC ARIA REFERENCE POINT SESSION DOSAGE GIVEN: 2.65 GY
RAD ONC ARIA REFERENCE POINT SESSION DOSAGE GIVEN: 2.69 GY

## 2023-04-28 PROCEDURE — 77387 GUIDANCE FOR RADJ TX DLVR: CPT | Performed by: STUDENT IN AN ORGANIZED HEALTH CARE EDUCATION/TRAINING PROGRAM

## 2023-04-28 PROCEDURE — 77412 RADIATION TX DELIVERY LVL 3: CPT | Performed by: STUDENT IN AN ORGANIZED HEALTH CARE EDUCATION/TRAINING PROGRAM

## 2023-04-28 PROCEDURE — G6002 STEREOSCOPIC X-RAY GUIDANCE: HCPCS | Performed by: STUDENT IN AN ORGANIZED HEALTH CARE EDUCATION/TRAINING PROGRAM

## 2023-05-01 ENCOUNTER — HOSPITAL ENCOUNTER (OUTPATIENT)
Dept: RADIATION ONCOLOGY | Facility: HOSPITAL | Age: 48
Discharge: HOME | End: 2023-05-01

## 2023-05-01 ENCOUNTER — RADIATION ONCOLOGY WEEKLY ASSESSMENT (OUTPATIENT)
Dept: RADIATION ONCOLOGY | Facility: HOSPITAL | Age: 48
End: 2023-05-01
Payer: COMMERCIAL

## 2023-05-01 ENCOUNTER — HOSPITAL ENCOUNTER (OUTPATIENT)
Dept: RADIATION ONCOLOGY | Facility: HOSPITAL | Age: 48
Setting detail: RADIATION/ONCOLOGY SERIES
End: 2023-05-01
Payer: COMMERCIAL

## 2023-05-01 VITALS
DIASTOLIC BLOOD PRESSURE: 71 MMHG | OXYGEN SATURATION: 100 % | HEART RATE: 76 BPM | WEIGHT: 132.2 LBS | SYSTOLIC BLOOD PRESSURE: 103 MMHG | BODY MASS INDEX: 22.69 KG/M2

## 2023-05-01 DIAGNOSIS — D05.12 DUCTAL CARCINOMA IN SITU (DCIS) OF LEFT BREAST: Primary | ICD-10-CM

## 2023-05-01 LAB
RAD ONC ARIA COURSE ID: NORMAL
RAD ONC ARIA COURSE INTENT: NORMAL
RAD ONC ARIA COURSE LAST TREATMENT DATE: NORMAL
RAD ONC ARIA COURSE START DATE: NORMAL
RAD ONC ARIA COURSE TREATMENT ELAPSED DAYS: 7
RAD ONC ARIA FIRST TREATMENT DATE: NORMAL
RAD ONC ARIA PLAN FRACTIONS TREATED TO DATE: 5
RAD ONC ARIA PLAN ID: NORMAL
RAD ONC ARIA PLAN PRESCRIBED DOSE PER FRACTION: 2.65 GY
RAD ONC ARIA PLAN PRIMARY REFERENCE POINT: NORMAL
RAD ONC ARIA PLAN TOTAL FRACTIONS PRESCRIBED: 16
RAD ONC ARIA PLAN TOTAL PRESCRIBED DOSE: 4240 CGY
RAD ONC ARIA REFERENCE POINT DOSAGE GIVEN TO DATE: 13.25 GY
RAD ONC ARIA REFERENCE POINT DOSAGE GIVEN TO DATE: 13.44 GY
RAD ONC ARIA REFERENCE POINT ID: NORMAL
RAD ONC ARIA REFERENCE POINT ID: NORMAL
RAD ONC ARIA REFERENCE POINT SESSION DOSAGE GIVEN: 2.65 GY
RAD ONC ARIA REFERENCE POINT SESSION DOSAGE GIVEN: 2.69 GY

## 2023-05-01 PROCEDURE — 77412 RADIATION TX DELIVERY LVL 3: CPT | Performed by: RADIOLOGY

## 2023-05-01 PROCEDURE — G6002 STEREOSCOPIC X-RAY GUIDANCE: HCPCS | Performed by: RADIOLOGY

## 2023-05-01 PROCEDURE — 77387 GUIDANCE FOR RADJ TX DLVR: CPT | Performed by: RADIOLOGY

## 2023-05-01 NOTE — PROGRESS NOTES
Patient: Ariana Zapata    MRN: [id number]    Attending Physician: Chuck Rg MD    Diagnosis:  DCIS    Radiation Therapy Visit:  Continue radiation therapy, Pain assessed, Radiation dose schedule reviewed and remains acceptable, Radiation technique remains acceptable and Symptoms within expected range except for nausea and indigestion.    Radiation Treatments     Active   Plans   L Breast_FiF_   Most recent treatment: Dose planned: 265 cGy (fraction 5 on 5/1/2023)   Total: Dose planned: 4,240 cGy (16 fractions)   Elapsed Days: 7      Reference Points   Rx: L Breast   Most recent treatment: Dose given: 265 cGy (on 5/1/2023)   Total: Dose given: 1,325 cGy   Elapsed Days: 7      calc L Breast   Most recent treatment: Dose given: 269 cGy (on 5/1/2023)   Total: Dose given: 1,344 cGy   Elapsed Days: 7                    Physical Examination:  Vitals: Blood pressure 103/71, pulse 76, weight 60 kg (132 lb 3.2 oz), SpO2 100 %.  Vitals:    05/01/23 1317   BP: 103/71   Pulse: 76   SpO2: 100%   Weight: 60 kg (132 lb 3.2 oz)     Pain Score    05/01/23 1317   PainSc: 0-No pain       Fully active, able to carry on all pre-disease performance without restriction = 0    We examined the relevant areas: yes  Findings are within the expected range for this stage of treatment: yes  -------------------------------------------------------------------------------------------------------------------    ACTION ITEMS:  Patient tolerating treatment well and as expected for this stage in their treatment and Continue radiation therapy as planned    Estimated Completion Date: May 22, 2023    Chuck Rg MD  Radiation Oncology

## 2023-05-02 ENCOUNTER — HOSPITAL ENCOUNTER (OUTPATIENT)
Dept: RADIATION ONCOLOGY | Facility: HOSPITAL | Age: 48
Discharge: HOME | End: 2023-05-02

## 2023-05-02 LAB
RAD ONC ARIA COURSE ID: NORMAL
RAD ONC ARIA COURSE INTENT: NORMAL
RAD ONC ARIA COURSE LAST TREATMENT DATE: NORMAL
RAD ONC ARIA COURSE START DATE: NORMAL
RAD ONC ARIA COURSE TREATMENT ELAPSED DAYS: 8
RAD ONC ARIA FIRST TREATMENT DATE: NORMAL
RAD ONC ARIA PLAN FRACTIONS TREATED TO DATE: 6
RAD ONC ARIA PLAN ID: NORMAL
RAD ONC ARIA PLAN PRESCRIBED DOSE PER FRACTION: 2.65 GY
RAD ONC ARIA PLAN PRIMARY REFERENCE POINT: NORMAL
RAD ONC ARIA PLAN TOTAL FRACTIONS PRESCRIBED: 16
RAD ONC ARIA PLAN TOTAL PRESCRIBED DOSE: 4240 CGY
RAD ONC ARIA REFERENCE POINT DOSAGE GIVEN TO DATE: 15.9 GY
RAD ONC ARIA REFERENCE POINT DOSAGE GIVEN TO DATE: 16.13 GY
RAD ONC ARIA REFERENCE POINT ID: NORMAL
RAD ONC ARIA REFERENCE POINT ID: NORMAL
RAD ONC ARIA REFERENCE POINT SESSION DOSAGE GIVEN: 2.65 GY
RAD ONC ARIA REFERENCE POINT SESSION DOSAGE GIVEN: 2.69 GY

## 2023-05-02 PROCEDURE — 77387 GUIDANCE FOR RADJ TX DLVR: CPT | Performed by: RADIOLOGY

## 2023-05-02 PROCEDURE — 77427 RADIATION TX MANAGEMENT X5: CPT | Performed by: RADIOLOGY

## 2023-05-02 PROCEDURE — 77412 RADIATION TX DELIVERY LVL 3: CPT | Performed by: RADIOLOGY

## 2023-05-02 PROCEDURE — G6002 STEREOSCOPIC X-RAY GUIDANCE: HCPCS | Performed by: RADIOLOGY

## 2023-05-03 ENCOUNTER — HOSPITAL ENCOUNTER (OUTPATIENT)
Dept: RADIATION ONCOLOGY | Facility: HOSPITAL | Age: 48
Discharge: HOME | End: 2023-05-03

## 2023-05-03 LAB
RAD ONC ARIA COURSE ID: NORMAL
RAD ONC ARIA COURSE INTENT: NORMAL
RAD ONC ARIA COURSE LAST TREATMENT DATE: NORMAL
RAD ONC ARIA COURSE START DATE: NORMAL
RAD ONC ARIA COURSE TREATMENT ELAPSED DAYS: 9
RAD ONC ARIA FIRST TREATMENT DATE: NORMAL
RAD ONC ARIA PLAN FRACTIONS TREATED TO DATE: 7
RAD ONC ARIA PLAN ID: NORMAL
RAD ONC ARIA PLAN PRESCRIBED DOSE PER FRACTION: 2.65 GY
RAD ONC ARIA PLAN PRIMARY REFERENCE POINT: NORMAL
RAD ONC ARIA PLAN TOTAL FRACTIONS PRESCRIBED: 16
RAD ONC ARIA PLAN TOTAL PRESCRIBED DOSE: 4240 CGY
RAD ONC ARIA REFERENCE POINT DOSAGE GIVEN TO DATE: 18.55 GY
RAD ONC ARIA REFERENCE POINT DOSAGE GIVEN TO DATE: 18.81 GY
RAD ONC ARIA REFERENCE POINT ID: NORMAL
RAD ONC ARIA REFERENCE POINT ID: NORMAL
RAD ONC ARIA REFERENCE POINT SESSION DOSAGE GIVEN: 2.65 GY
RAD ONC ARIA REFERENCE POINT SESSION DOSAGE GIVEN: 2.69 GY

## 2023-05-03 PROCEDURE — 77387 GUIDANCE FOR RADJ TX DLVR: CPT | Performed by: RADIOLOGY

## 2023-05-03 PROCEDURE — 77412 RADIATION TX DELIVERY LVL 3: CPT | Performed by: RADIOLOGY

## 2023-05-03 PROCEDURE — G6002 STEREOSCOPIC X-RAY GUIDANCE: HCPCS | Performed by: RADIOLOGY

## 2023-05-04 ENCOUNTER — HOSPITAL ENCOUNTER (OUTPATIENT)
Dept: RADIATION ONCOLOGY | Facility: HOSPITAL | Age: 48
Discharge: HOME | End: 2023-05-04

## 2023-05-04 LAB
RAD ONC ARIA COURSE ID: NORMAL
RAD ONC ARIA COURSE INTENT: NORMAL
RAD ONC ARIA COURSE LAST TREATMENT DATE: NORMAL
RAD ONC ARIA COURSE START DATE: NORMAL
RAD ONC ARIA COURSE TREATMENT ELAPSED DAYS: 10
RAD ONC ARIA FIRST TREATMENT DATE: NORMAL
RAD ONC ARIA PLAN FRACTIONS TREATED TO DATE: 8
RAD ONC ARIA PLAN ID: NORMAL
RAD ONC ARIA PLAN PRESCRIBED DOSE PER FRACTION: 2.65 GY
RAD ONC ARIA PLAN PRIMARY REFERENCE POINT: NORMAL
RAD ONC ARIA PLAN TOTAL FRACTIONS PRESCRIBED: 16
RAD ONC ARIA PLAN TOTAL PRESCRIBED DOSE: 4240 CGY
RAD ONC ARIA REFERENCE POINT DOSAGE GIVEN TO DATE: 21.2 GY
RAD ONC ARIA REFERENCE POINT DOSAGE GIVEN TO DATE: 21.5 GY
RAD ONC ARIA REFERENCE POINT ID: NORMAL
RAD ONC ARIA REFERENCE POINT ID: NORMAL
RAD ONC ARIA REFERENCE POINT SESSION DOSAGE GIVEN: 2.65 GY
RAD ONC ARIA REFERENCE POINT SESSION DOSAGE GIVEN: 2.69 GY

## 2023-05-04 PROCEDURE — 77336 RADIATION PHYSICS CONSULT: CPT | Performed by: STUDENT IN AN ORGANIZED HEALTH CARE EDUCATION/TRAINING PROGRAM

## 2023-05-04 PROCEDURE — 77412 RADIATION TX DELIVERY LVL 3: CPT | Performed by: RADIOLOGY

## 2023-05-04 PROCEDURE — 77387 GUIDANCE FOR RADJ TX DLVR: CPT | Performed by: RADIOLOGY

## 2023-05-05 ENCOUNTER — HOSPITAL ENCOUNTER (OUTPATIENT)
Dept: RADIATION ONCOLOGY | Facility: HOSPITAL | Age: 48
Discharge: HOME | End: 2023-05-05

## 2023-05-05 LAB
RAD ONC ARIA COURSE ID: NORMAL
RAD ONC ARIA COURSE INTENT: NORMAL
RAD ONC ARIA COURSE LAST TREATMENT DATE: NORMAL
RAD ONC ARIA COURSE START DATE: NORMAL
RAD ONC ARIA COURSE TREATMENT ELAPSED DAYS: 11
RAD ONC ARIA FIRST TREATMENT DATE: NORMAL
RAD ONC ARIA PLAN FRACTIONS TREATED TO DATE: 9
RAD ONC ARIA PLAN ID: NORMAL
RAD ONC ARIA PLAN PRESCRIBED DOSE PER FRACTION: 2.65 GY
RAD ONC ARIA PLAN PRIMARY REFERENCE POINT: NORMAL
RAD ONC ARIA PLAN TOTAL FRACTIONS PRESCRIBED: 16
RAD ONC ARIA PLAN TOTAL PRESCRIBED DOSE: 4240 CGY
RAD ONC ARIA REFERENCE POINT DOSAGE GIVEN TO DATE: 23.85 GY
RAD ONC ARIA REFERENCE POINT DOSAGE GIVEN TO DATE: 24.19 GY
RAD ONC ARIA REFERENCE POINT ID: NORMAL
RAD ONC ARIA REFERENCE POINT ID: NORMAL
RAD ONC ARIA REFERENCE POINT SESSION DOSAGE GIVEN: 2.65 GY
RAD ONC ARIA REFERENCE POINT SESSION DOSAGE GIVEN: 2.69 GY

## 2023-05-05 PROCEDURE — 77412 RADIATION TX DELIVERY LVL 3: CPT | Performed by: RADIOLOGY

## 2023-05-05 PROCEDURE — G6002 STEREOSCOPIC X-RAY GUIDANCE: HCPCS | Performed by: RADIOLOGY

## 2023-05-05 PROCEDURE — 77387 GUIDANCE FOR RADJ TX DLVR: CPT | Performed by: RADIOLOGY

## 2023-05-08 ENCOUNTER — RADIATION ONCOLOGY WEEKLY ASSESSMENT (OUTPATIENT)
Dept: RADIATION ONCOLOGY | Facility: HOSPITAL | Age: 48
End: 2023-05-08
Payer: COMMERCIAL

## 2023-05-08 ENCOUNTER — HOSPITAL ENCOUNTER (OUTPATIENT)
Dept: RADIATION ONCOLOGY | Facility: HOSPITAL | Age: 48
Discharge: HOME | End: 2023-05-08

## 2023-05-08 VITALS
BODY MASS INDEX: 22.76 KG/M2 | DIASTOLIC BLOOD PRESSURE: 71 MMHG | HEART RATE: 68 BPM | SYSTOLIC BLOOD PRESSURE: 107 MMHG | OXYGEN SATURATION: 100 % | WEIGHT: 132.6 LBS

## 2023-05-08 DIAGNOSIS — D05.12 DUCTAL CARCINOMA IN SITU (DCIS) OF LEFT BREAST: Primary | ICD-10-CM

## 2023-05-08 LAB
RAD ONC ARIA COURSE ID: NORMAL
RAD ONC ARIA COURSE INTENT: NORMAL
RAD ONC ARIA COURSE LAST TREATMENT DATE: NORMAL
RAD ONC ARIA COURSE START DATE: NORMAL
RAD ONC ARIA COURSE TREATMENT ELAPSED DAYS: 14
RAD ONC ARIA FIRST TREATMENT DATE: NORMAL
RAD ONC ARIA PLAN FRACTIONS TREATED TO DATE: 10
RAD ONC ARIA PLAN ID: NORMAL
RAD ONC ARIA PLAN PRESCRIBED DOSE PER FRACTION: 2.65 GY
RAD ONC ARIA PLAN PRIMARY REFERENCE POINT: NORMAL
RAD ONC ARIA PLAN TOTAL FRACTIONS PRESCRIBED: 16
RAD ONC ARIA PLAN TOTAL PRESCRIBED DOSE: 4240 CGY
RAD ONC ARIA REFERENCE POINT DOSAGE GIVEN TO DATE: 26.5 GY
RAD ONC ARIA REFERENCE POINT DOSAGE GIVEN TO DATE: 26.88 GY
RAD ONC ARIA REFERENCE POINT ID: NORMAL
RAD ONC ARIA REFERENCE POINT ID: NORMAL
RAD ONC ARIA REFERENCE POINT SESSION DOSAGE GIVEN: 2.65 GY
RAD ONC ARIA REFERENCE POINT SESSION DOSAGE GIVEN: 2.69 GY

## 2023-05-08 PROCEDURE — 77412 RADIATION TX DELIVERY LVL 3: CPT | Performed by: RADIOLOGY

## 2023-05-08 PROCEDURE — 77387 GUIDANCE FOR RADJ TX DLVR: CPT | Performed by: RADIOLOGY

## 2023-05-08 NOTE — PROGRESS NOTES
Patient: Ariana Zapata    MRN: [id number]    Attending Physician: Chuck Rg MD    Diagnosis: No diagnosis found.    Radiation Therapy Visit:  Having skin reaction, especially upper inner quadrant. Skin intact.  Itches in this area.  Using topical cream (Strata).  Continue radiation therapy, Dosimetry plan remains acceptable, Films reviewed and remains acceptable, Pain assessed, Pain management planned, Radiation dose schedule reviewed and remains acceptable, Radiation technique remains acceptable and Symptoms within expected range    Radiation Treatments     Active   Plans   L Breast_FiF_   Most recent treatment: Dose planned: 265 cGy (fraction 10 on 5/8/2023)   Total: Dose planned: 4,240 cGy (16 fractions)   Elapsed Days: 14      Reference Points   Rx: L Breast   Most recent treatment: Dose given: 265 cGy (on 5/8/2023)   Total: Dose given: 2,650 cGy   Elapsed Days: 14      calc L Breast   Most recent treatment: Dose given: 269 cGy (on 5/8/2023)   Total: Dose given: 2,688 cGy   Elapsed Days: 14                    Physical Examination:  Vitals: Blood pressure 107/71, pulse 68, weight 60.1 kg (132 lb 9.6 oz), SpO2 100 %.  Vitals:    05/08/23 1114   BP: 107/71   Pulse: 68   SpO2: 100%   Weight: 60.1 kg (132 lb 9.6 oz)     Pain Score    05/08/23 1114   PainSc: 0-No pain   Moderate erythema, upper inner quadrant.  No moist desquamation.    Fully active, able to carry on all pre-disease performance without restriction = 0    We examined the relevant areas: yes  Findings are within the expected range for this stage of treatment: yes  -------------------------------------------------------------------------------------------------------------------    ACTION ITEMS:  Patient tolerating treatment well and as expected for this stage in their treatment and Continue radiation therapy as planned   Consider topical steroid cream to better manage itching.        Chuck Rg MD  Radiation Oncology

## 2023-05-09 ENCOUNTER — HOSPITAL ENCOUNTER (OUTPATIENT)
Dept: RADIATION ONCOLOGY | Facility: HOSPITAL | Age: 48
Discharge: HOME | End: 2023-05-09

## 2023-05-09 LAB
RAD ONC ARIA COURSE ID: NORMAL
RAD ONC ARIA COURSE INTENT: NORMAL
RAD ONC ARIA COURSE LAST TREATMENT DATE: NORMAL
RAD ONC ARIA COURSE START DATE: NORMAL
RAD ONC ARIA COURSE TREATMENT ELAPSED DAYS: 15
RAD ONC ARIA FIRST TREATMENT DATE: NORMAL
RAD ONC ARIA PLAN FRACTIONS TREATED TO DATE: 11
RAD ONC ARIA PLAN ID: NORMAL
RAD ONC ARIA PLAN PRESCRIBED DOSE PER FRACTION: 2.65 GY
RAD ONC ARIA PLAN PRIMARY REFERENCE POINT: NORMAL
RAD ONC ARIA PLAN TOTAL FRACTIONS PRESCRIBED: 16
RAD ONC ARIA PLAN TOTAL PRESCRIBED DOSE: 4240 CGY
RAD ONC ARIA REFERENCE POINT DOSAGE GIVEN TO DATE: 29.15 GY
RAD ONC ARIA REFERENCE POINT DOSAGE GIVEN TO DATE: 29.56 GY
RAD ONC ARIA REFERENCE POINT ID: NORMAL
RAD ONC ARIA REFERENCE POINT ID: NORMAL
RAD ONC ARIA REFERENCE POINT SESSION DOSAGE GIVEN: 2.65 GY
RAD ONC ARIA REFERENCE POINT SESSION DOSAGE GIVEN: 2.69 GY

## 2023-05-09 PROCEDURE — 77427 RADIATION TX MANAGEMENT X5: CPT | Performed by: RADIOLOGY

## 2023-05-09 PROCEDURE — G6002 STEREOSCOPIC X-RAY GUIDANCE: HCPCS | Performed by: RADIOLOGY

## 2023-05-09 PROCEDURE — 77412 RADIATION TX DELIVERY LVL 3: CPT | Performed by: RADIOLOGY

## 2023-05-09 PROCEDURE — 77387 GUIDANCE FOR RADJ TX DLVR: CPT | Performed by: RADIOLOGY

## 2023-05-10 ENCOUNTER — HOSPITAL ENCOUNTER (OUTPATIENT)
Dept: RADIATION ONCOLOGY | Facility: HOSPITAL | Age: 48
Discharge: HOME | End: 2023-05-10

## 2023-05-10 LAB
RAD ONC ARIA COURSE ID: NORMAL
RAD ONC ARIA COURSE INTENT: NORMAL
RAD ONC ARIA COURSE LAST TREATMENT DATE: NORMAL
RAD ONC ARIA COURSE START DATE: NORMAL
RAD ONC ARIA COURSE TREATMENT ELAPSED DAYS: 16
RAD ONC ARIA FIRST TREATMENT DATE: NORMAL
RAD ONC ARIA PLAN FRACTIONS TREATED TO DATE: 12
RAD ONC ARIA PLAN ID: NORMAL
RAD ONC ARIA PLAN PRESCRIBED DOSE PER FRACTION: 2.65 GY
RAD ONC ARIA PLAN PRIMARY REFERENCE POINT: NORMAL
RAD ONC ARIA PLAN TOTAL FRACTIONS PRESCRIBED: 16
RAD ONC ARIA PLAN TOTAL PRESCRIBED DOSE: 4240 CGY
RAD ONC ARIA REFERENCE POINT DOSAGE GIVEN TO DATE: 31.8 GY
RAD ONC ARIA REFERENCE POINT DOSAGE GIVEN TO DATE: 32.25 GY
RAD ONC ARIA REFERENCE POINT ID: NORMAL
RAD ONC ARIA REFERENCE POINT ID: NORMAL
RAD ONC ARIA REFERENCE POINT SESSION DOSAGE GIVEN: 2.65 GY
RAD ONC ARIA REFERENCE POINT SESSION DOSAGE GIVEN: 2.69 GY

## 2023-05-10 PROCEDURE — 77412 RADIATION TX DELIVERY LVL 3: CPT | Performed by: RADIOLOGY

## 2023-05-10 PROCEDURE — 77387 GUIDANCE FOR RADJ TX DLVR: CPT | Performed by: RADIOLOGY

## 2023-05-10 PROCEDURE — G6002 STEREOSCOPIC X-RAY GUIDANCE: HCPCS | Performed by: RADIOLOGY

## 2023-05-11 ENCOUNTER — HOSPITAL ENCOUNTER (OUTPATIENT)
Dept: RADIATION ONCOLOGY | Facility: HOSPITAL | Age: 48
Discharge: HOME | End: 2023-05-11

## 2023-05-11 LAB
RAD ONC ARIA COURSE ID: NORMAL
RAD ONC ARIA COURSE INTENT: NORMAL
RAD ONC ARIA COURSE LAST TREATMENT DATE: NORMAL
RAD ONC ARIA COURSE START DATE: NORMAL
RAD ONC ARIA COURSE TREATMENT ELAPSED DAYS: 17
RAD ONC ARIA FIRST TREATMENT DATE: NORMAL
RAD ONC ARIA PLAN FRACTIONS TREATED TO DATE: 13
RAD ONC ARIA PLAN ID: NORMAL
RAD ONC ARIA PLAN PRESCRIBED DOSE PER FRACTION: 2.65 GY
RAD ONC ARIA PLAN PRIMARY REFERENCE POINT: NORMAL
RAD ONC ARIA PLAN TOTAL FRACTIONS PRESCRIBED: 16
RAD ONC ARIA PLAN TOTAL PRESCRIBED DOSE: 4240 CGY
RAD ONC ARIA REFERENCE POINT DOSAGE GIVEN TO DATE: 34.45 GY
RAD ONC ARIA REFERENCE POINT DOSAGE GIVEN TO DATE: 34.94 GY
RAD ONC ARIA REFERENCE POINT ID: NORMAL
RAD ONC ARIA REFERENCE POINT ID: NORMAL
RAD ONC ARIA REFERENCE POINT SESSION DOSAGE GIVEN: 2.65 GY
RAD ONC ARIA REFERENCE POINT SESSION DOSAGE GIVEN: 2.69 GY

## 2023-05-11 PROCEDURE — 77412 RADIATION TX DELIVERY LVL 3: CPT | Performed by: STUDENT IN AN ORGANIZED HEALTH CARE EDUCATION/TRAINING PROGRAM

## 2023-05-11 PROCEDURE — 77336 RADIATION PHYSICS CONSULT: CPT | Performed by: STUDENT IN AN ORGANIZED HEALTH CARE EDUCATION/TRAINING PROGRAM

## 2023-05-11 PROCEDURE — 77387 GUIDANCE FOR RADJ TX DLVR: CPT | Performed by: STUDENT IN AN ORGANIZED HEALTH CARE EDUCATION/TRAINING PROGRAM

## 2023-05-11 PROCEDURE — G6002 STEREOSCOPIC X-RAY GUIDANCE: HCPCS | Performed by: STUDENT IN AN ORGANIZED HEALTH CARE EDUCATION/TRAINING PROGRAM

## 2023-05-12 ENCOUNTER — HOSPITAL ENCOUNTER (OUTPATIENT)
Dept: RADIATION ONCOLOGY | Facility: HOSPITAL | Age: 48
Discharge: HOME | End: 2023-05-12

## 2023-05-12 LAB
RAD ONC ARIA COURSE ID: NORMAL
RAD ONC ARIA COURSE INTENT: NORMAL
RAD ONC ARIA COURSE LAST TREATMENT DATE: NORMAL
RAD ONC ARIA COURSE START DATE: NORMAL
RAD ONC ARIA COURSE TREATMENT ELAPSED DAYS: 18
RAD ONC ARIA FIRST TREATMENT DATE: NORMAL
RAD ONC ARIA PLAN FRACTIONS TREATED TO DATE: 14
RAD ONC ARIA PLAN ID: NORMAL
RAD ONC ARIA PLAN PRESCRIBED DOSE PER FRACTION: 2.65 GY
RAD ONC ARIA PLAN PRIMARY REFERENCE POINT: NORMAL
RAD ONC ARIA PLAN TOTAL FRACTIONS PRESCRIBED: 16
RAD ONC ARIA PLAN TOTAL PRESCRIBED DOSE: 4240 CGY
RAD ONC ARIA REFERENCE POINT DOSAGE GIVEN TO DATE: 37.1 GY
RAD ONC ARIA REFERENCE POINT DOSAGE GIVEN TO DATE: 37.63 GY
RAD ONC ARIA REFERENCE POINT ID: NORMAL
RAD ONC ARIA REFERENCE POINT ID: NORMAL
RAD ONC ARIA REFERENCE POINT SESSION DOSAGE GIVEN: 2.65 GY
RAD ONC ARIA REFERENCE POINT SESSION DOSAGE GIVEN: 2.69 GY

## 2023-05-12 PROCEDURE — 77387 GUIDANCE FOR RADJ TX DLVR: CPT | Performed by: STUDENT IN AN ORGANIZED HEALTH CARE EDUCATION/TRAINING PROGRAM

## 2023-05-12 PROCEDURE — G6002 STEREOSCOPIC X-RAY GUIDANCE: HCPCS | Performed by: STUDENT IN AN ORGANIZED HEALTH CARE EDUCATION/TRAINING PROGRAM

## 2023-05-12 PROCEDURE — 77412 RADIATION TX DELIVERY LVL 3: CPT | Performed by: STUDENT IN AN ORGANIZED HEALTH CARE EDUCATION/TRAINING PROGRAM

## 2023-05-15 ENCOUNTER — HOSPITAL ENCOUNTER (OUTPATIENT)
Dept: RADIATION ONCOLOGY | Facility: HOSPITAL | Age: 48
Discharge: HOME | End: 2023-05-15

## 2023-05-15 ENCOUNTER — RADIATION ONCOLOGY WEEKLY ASSESSMENT (OUTPATIENT)
Dept: RADIATION ONCOLOGY | Facility: HOSPITAL | Age: 48
End: 2023-05-15
Payer: COMMERCIAL

## 2023-05-15 VITALS
DIASTOLIC BLOOD PRESSURE: 70 MMHG | HEART RATE: 79 BPM | WEIGHT: 132.4 LBS | OXYGEN SATURATION: 100 % | BODY MASS INDEX: 22.73 KG/M2 | SYSTOLIC BLOOD PRESSURE: 105 MMHG

## 2023-05-15 DIAGNOSIS — D05.12 DUCTAL CARCINOMA IN SITU (DCIS) OF LEFT BREAST: Primary | ICD-10-CM

## 2023-05-15 LAB
RAD ONC ARIA COURSE ID: NORMAL
RAD ONC ARIA COURSE INTENT: NORMAL
RAD ONC ARIA COURSE LAST TREATMENT DATE: NORMAL
RAD ONC ARIA COURSE START DATE: NORMAL
RAD ONC ARIA COURSE TREATMENT ELAPSED DAYS: 21
RAD ONC ARIA FIRST TREATMENT DATE: NORMAL
RAD ONC ARIA PLAN FRACTIONS TREATED TO DATE: 15
RAD ONC ARIA PLAN ID: NORMAL
RAD ONC ARIA PLAN PRESCRIBED DOSE PER FRACTION: 2.65 GY
RAD ONC ARIA PLAN PRIMARY REFERENCE POINT: NORMAL
RAD ONC ARIA PLAN TOTAL FRACTIONS PRESCRIBED: 16
RAD ONC ARIA PLAN TOTAL PRESCRIBED DOSE: 4240 CGY
RAD ONC ARIA REFERENCE POINT DOSAGE GIVEN TO DATE: 39.75 GY
RAD ONC ARIA REFERENCE POINT DOSAGE GIVEN TO DATE: 40.31 GY
RAD ONC ARIA REFERENCE POINT ID: NORMAL
RAD ONC ARIA REFERENCE POINT ID: NORMAL
RAD ONC ARIA REFERENCE POINT SESSION DOSAGE GIVEN: 2.65 GY
RAD ONC ARIA REFERENCE POINT SESSION DOSAGE GIVEN: 2.69 GY

## 2023-05-15 PROCEDURE — 77412 RADIATION TX DELIVERY LVL 3: CPT | Performed by: STUDENT IN AN ORGANIZED HEALTH CARE EDUCATION/TRAINING PROGRAM

## 2023-05-15 PROCEDURE — G6002 STEREOSCOPIC X-RAY GUIDANCE: HCPCS | Performed by: STUDENT IN AN ORGANIZED HEALTH CARE EDUCATION/TRAINING PROGRAM

## 2023-05-15 PROCEDURE — 77387 GUIDANCE FOR RADJ TX DLVR: CPT | Performed by: STUDENT IN AN ORGANIZED HEALTH CARE EDUCATION/TRAINING PROGRAM

## 2023-05-15 RX ORDER — MOMETASONE FUROATE 1 MG/G
1 OINTMENT TOPICAL DAILY
Qty: 45 G | Refills: 1 | Status: SHIPPED | OUTPATIENT
Start: 2023-05-15

## 2023-05-15 NOTE — PROGRESS NOTES
Radiation Oncology  On-Treatment Note      Patient: Ariana Zapata    MRN: [id number]    Attending Physician: Chet Andrade MD     Diagnosis:     ICD-10-CM ICD-9-CM   1. Ductal carcinoma in situ (DCIS) of left breast  D05.12 233.0       Radiation Therapy Visit:  Continue radiation therapy, Dosimetry plan remains acceptable, Films reviewed and remains acceptable, Pain assessed, Pain management planned, Radiation dose schedule reviewed and remains acceptable, Radiation technique remains acceptable and Symptoms within expected range    Radiation Treatments     Active   Plans   L Breast_FiF_   Most recent treatment: Dose planned: 265 cGy (fraction 15 on 5/15/2023)   Total: Dose planned: 4,240 cGy (16 fractions)   Elapsed Days: 21      Reference Points   Rx: L Breast   Most recent treatment: Dose given: 265 cGy (on 5/15/2023)   Total: Dose given: 3,975 cGy   Elapsed Days: 21      calc L Breast   Most recent treatment: Dose given: 269 cGy (on 5/15/2023)   Total: Dose given: 4,031 cGy   Elapsed Days: 21                    Physical Examination:  Vitals: Blood pressure 105/70, pulse 79, weight 60.1 kg (132 lb 6.4 oz), SpO2 100 %.  Vitals:    05/15/23 1119   BP: 105/70   Pulse: 79   SpO2: 100%   Weight: 60.1 kg (132 lb 6.4 oz)     Pain Score    05/15/23 1119   PainSc: 0-No pain       Fully active, able to carry on all pre-disease performance without restriction = 0    We examined the relevant areas: yes  Findings are within the expected range for this stage of treatment: yes  -------------------------------------------------------------------------------------------------------------------    ACTION ITEMS:  Patient tolerating treatment well and as expected for this stage in their treatment and Continue radiation therapy as planned    Wrote for mometasone due to pruritis     Estimated Completion Date: 5/22/2023      Chet Andrade MD  Radiation Oncology

## 2023-05-16 ENCOUNTER — HOSPITAL ENCOUNTER (OUTPATIENT)
Dept: RADIATION ONCOLOGY | Facility: HOSPITAL | Age: 48
Discharge: HOME | End: 2023-05-16

## 2023-05-16 LAB
RAD ONC ARIA COURSE ID: NORMAL
RAD ONC ARIA COURSE INTENT: NORMAL
RAD ONC ARIA COURSE LAST TREATMENT DATE: NORMAL
RAD ONC ARIA COURSE START DATE: NORMAL
RAD ONC ARIA COURSE TREATMENT ELAPSED DAYS: 22
RAD ONC ARIA FIRST TREATMENT DATE: NORMAL
RAD ONC ARIA PLAN FRACTIONS TREATED TO DATE: 16
RAD ONC ARIA PLAN ID: NORMAL
RAD ONC ARIA PLAN PRESCRIBED DOSE PER FRACTION: 2.65 GY
RAD ONC ARIA PLAN PRIMARY REFERENCE POINT: NORMAL
RAD ONC ARIA PLAN TOTAL FRACTIONS PRESCRIBED: 16
RAD ONC ARIA PLAN TOTAL PRESCRIBED DOSE: 4240 CGY
RAD ONC ARIA REFERENCE POINT DOSAGE GIVEN TO DATE: 42.4 GY
RAD ONC ARIA REFERENCE POINT DOSAGE GIVEN TO DATE: 43 GY
RAD ONC ARIA REFERENCE POINT ID: NORMAL
RAD ONC ARIA REFERENCE POINT ID: NORMAL
RAD ONC ARIA REFERENCE POINT SESSION DOSAGE GIVEN: 2.65 GY
RAD ONC ARIA REFERENCE POINT SESSION DOSAGE GIVEN: 2.69 GY

## 2023-05-16 PROCEDURE — 77387 GUIDANCE FOR RADJ TX DLVR: CPT | Performed by: STUDENT IN AN ORGANIZED HEALTH CARE EDUCATION/TRAINING PROGRAM

## 2023-05-16 PROCEDURE — 77412 RADIATION TX DELIVERY LVL 3: CPT | Performed by: STUDENT IN AN ORGANIZED HEALTH CARE EDUCATION/TRAINING PROGRAM

## 2023-05-16 PROCEDURE — 77427 RADIATION TX MANAGEMENT X5: CPT | Performed by: STUDENT IN AN ORGANIZED HEALTH CARE EDUCATION/TRAINING PROGRAM

## 2023-05-17 ENCOUNTER — HOSPITAL ENCOUNTER (OUTPATIENT)
Dept: RADIATION ONCOLOGY | Facility: HOSPITAL | Age: 48
Discharge: HOME | End: 2023-05-17

## 2023-05-17 LAB
RAD ONC ARIA COURSE ID: NORMAL
RAD ONC ARIA COURSE INTENT: NORMAL
RAD ONC ARIA COURSE LAST TREATMENT DATE: NORMAL
RAD ONC ARIA COURSE START DATE: NORMAL
RAD ONC ARIA COURSE TREATMENT ELAPSED DAYS: 23
RAD ONC ARIA FIRST TREATMENT DATE: NORMAL
RAD ONC ARIA PLAN FRACTIONS TREATED TO DATE: 1
RAD ONC ARIA PLAN ID: NORMAL
RAD ONC ARIA PLAN PRESCRIBED DOSE PER FRACTION: 2.5 GY
RAD ONC ARIA PLAN PRIMARY REFERENCE POINT: NORMAL
RAD ONC ARIA PLAN TOTAL FRACTIONS PRESCRIBED: 4
RAD ONC ARIA PLAN TOTAL PRESCRIBED DOSE: 1000 CGY
RAD ONC ARIA REFERENCE POINT DOSAGE GIVEN TO DATE: 2.62 GY
RAD ONC ARIA REFERENCE POINT DOSAGE GIVEN TO DATE: 44.9 GY
RAD ONC ARIA REFERENCE POINT ID: NORMAL
RAD ONC ARIA REFERENCE POINT ID: NORMAL
RAD ONC ARIA REFERENCE POINT SESSION DOSAGE GIVEN: 2.5 GY
RAD ONC ARIA REFERENCE POINT SESSION DOSAGE GIVEN: 2.62 GY

## 2023-05-17 PROCEDURE — 77387 GUIDANCE FOR RADJ TX DLVR: CPT | Performed by: STUDENT IN AN ORGANIZED HEALTH CARE EDUCATION/TRAINING PROGRAM

## 2023-05-17 PROCEDURE — 77412 RADIATION TX DELIVERY LVL 3: CPT | Performed by: STUDENT IN AN ORGANIZED HEALTH CARE EDUCATION/TRAINING PROGRAM

## 2023-05-17 PROCEDURE — 77280 THER RAD SIMULAJ FIELD SMPL: CPT | Performed by: STUDENT IN AN ORGANIZED HEALTH CARE EDUCATION/TRAINING PROGRAM

## 2023-05-18 ENCOUNTER — HOSPITAL ENCOUNTER (OUTPATIENT)
Dept: RADIATION ONCOLOGY | Facility: HOSPITAL | Age: 48
Discharge: HOME | End: 2023-05-18

## 2023-05-18 LAB
RAD ONC ARIA COURSE ID: NORMAL
RAD ONC ARIA COURSE INTENT: NORMAL
RAD ONC ARIA COURSE LAST TREATMENT DATE: NORMAL
RAD ONC ARIA COURSE START DATE: NORMAL
RAD ONC ARIA COURSE TREATMENT ELAPSED DAYS: 24
RAD ONC ARIA FIRST TREATMENT DATE: NORMAL
RAD ONC ARIA PLAN FRACTIONS TREATED TO DATE: 2
RAD ONC ARIA PLAN ID: NORMAL
RAD ONC ARIA PLAN PRESCRIBED DOSE PER FRACTION: 2.5 GY
RAD ONC ARIA PLAN PRIMARY REFERENCE POINT: NORMAL
RAD ONC ARIA PLAN TOTAL FRACTIONS PRESCRIBED: 4
RAD ONC ARIA PLAN TOTAL PRESCRIBED DOSE: 1000 CGY
RAD ONC ARIA REFERENCE POINT DOSAGE GIVEN TO DATE: 47.4 GY
RAD ONC ARIA REFERENCE POINT DOSAGE GIVEN TO DATE: 5.23 GY
RAD ONC ARIA REFERENCE POINT ID: NORMAL
RAD ONC ARIA REFERENCE POINT ID: NORMAL
RAD ONC ARIA REFERENCE POINT SESSION DOSAGE GIVEN: 2.5 GY
RAD ONC ARIA REFERENCE POINT SESSION DOSAGE GIVEN: 2.62 GY

## 2023-05-18 PROCEDURE — 77336 RADIATION PHYSICS CONSULT: CPT | Performed by: STUDENT IN AN ORGANIZED HEALTH CARE EDUCATION/TRAINING PROGRAM

## 2023-05-18 PROCEDURE — 77412 RADIATION TX DELIVERY LVL 3: CPT | Performed by: STUDENT IN AN ORGANIZED HEALTH CARE EDUCATION/TRAINING PROGRAM

## 2023-05-18 PROCEDURE — 77387 GUIDANCE FOR RADJ TX DLVR: CPT | Performed by: STUDENT IN AN ORGANIZED HEALTH CARE EDUCATION/TRAINING PROGRAM

## 2023-05-19 ENCOUNTER — HOSPITAL ENCOUNTER (OUTPATIENT)
Dept: RADIATION ONCOLOGY | Facility: HOSPITAL | Age: 48
Discharge: HOME | End: 2023-05-19

## 2023-05-19 LAB
RAD ONC ARIA COURSE ID: NORMAL
RAD ONC ARIA COURSE INTENT: NORMAL
RAD ONC ARIA COURSE LAST TREATMENT DATE: NORMAL
RAD ONC ARIA COURSE START DATE: NORMAL
RAD ONC ARIA COURSE TREATMENT ELAPSED DAYS: 25
RAD ONC ARIA FIRST TREATMENT DATE: NORMAL
RAD ONC ARIA PLAN FRACTIONS TREATED TO DATE: 3
RAD ONC ARIA PLAN ID: NORMAL
RAD ONC ARIA PLAN PRESCRIBED DOSE PER FRACTION: 2.5 GY
RAD ONC ARIA PLAN PRIMARY REFERENCE POINT: NORMAL
RAD ONC ARIA PLAN TOTAL FRACTIONS PRESCRIBED: 4
RAD ONC ARIA PLAN TOTAL PRESCRIBED DOSE: 1000 CGY
RAD ONC ARIA REFERENCE POINT DOSAGE GIVEN TO DATE: 49.9 GY
RAD ONC ARIA REFERENCE POINT DOSAGE GIVEN TO DATE: 7.85 GY
RAD ONC ARIA REFERENCE POINT ID: NORMAL
RAD ONC ARIA REFERENCE POINT ID: NORMAL
RAD ONC ARIA REFERENCE POINT SESSION DOSAGE GIVEN: 2.5 GY
RAD ONC ARIA REFERENCE POINT SESSION DOSAGE GIVEN: 2.62 GY

## 2023-05-19 PROCEDURE — 77387 GUIDANCE FOR RADJ TX DLVR: CPT | Performed by: STUDENT IN AN ORGANIZED HEALTH CARE EDUCATION/TRAINING PROGRAM

## 2023-05-19 PROCEDURE — 77412 RADIATION TX DELIVERY LVL 3: CPT | Performed by: STUDENT IN AN ORGANIZED HEALTH CARE EDUCATION/TRAINING PROGRAM

## 2023-05-22 ENCOUNTER — RADIATION ONCOLOGY WEEKLY ASSESSMENT (OUTPATIENT)
Dept: RADIATION ONCOLOGY | Facility: HOSPITAL | Age: 48
End: 2023-05-22
Payer: COMMERCIAL

## 2023-05-22 ENCOUNTER — TREATMENT (OUTPATIENT)
Dept: RADIATION ONCOLOGY | Facility: HOSPITAL | Age: 48
End: 2023-05-22

## 2023-05-22 ENCOUNTER — HOSPITAL ENCOUNTER (OUTPATIENT)
Dept: RADIATION ONCOLOGY | Facility: HOSPITAL | Age: 48
Discharge: HOME | End: 2023-05-22

## 2023-05-22 VITALS
HEART RATE: 81 BPM | OXYGEN SATURATION: 100 % | WEIGHT: 136.6 LBS | BODY MASS INDEX: 23.45 KG/M2 | DIASTOLIC BLOOD PRESSURE: 72 MMHG | SYSTOLIC BLOOD PRESSURE: 108 MMHG

## 2023-05-22 DIAGNOSIS — D05.12 DUCTAL CARCINOMA IN SITU (DCIS) OF LEFT BREAST: Primary | ICD-10-CM

## 2023-05-22 LAB
RAD ONC ARIA COURSE END DATE: NORMAL
RAD ONC ARIA COURSE ID: NORMAL
RAD ONC ARIA COURSE ID: NORMAL
RAD ONC ARIA COURSE INTENT: NORMAL
RAD ONC ARIA COURSE INTENT: NORMAL
RAD ONC ARIA COURSE LAST TREATMENT DATE: NORMAL
RAD ONC ARIA COURSE LAST TREATMENT DATE: NORMAL
RAD ONC ARIA COURSE START DATE: NORMAL
RAD ONC ARIA COURSE START DATE: NORMAL
RAD ONC ARIA COURSE TREATMENT ELAPSED DAYS: 28
RAD ONC ARIA COURSE TREATMENT ELAPSED DAYS: 28
RAD ONC ARIA FIRST TREATMENT DATE: NORMAL
RAD ONC ARIA FIRST TREATMENT DATE: NORMAL
RAD ONC ARIA PLAN FRACTIONS TREATED TO DATE: 16
RAD ONC ARIA PLAN FRACTIONS TREATED TO DATE: 4
RAD ONC ARIA PLAN FRACTIONS TREATED TO DATE: 4
RAD ONC ARIA PLAN ID: NORMAL
RAD ONC ARIA PLAN NAME: NORMAL
RAD ONC ARIA PLAN NAME: NORMAL
RAD ONC ARIA PLAN PRESCRIBED DOSE PER FRACTION: 2.5 GY
RAD ONC ARIA PLAN PRESCRIBED DOSE PER FRACTION: 2.5 GY
RAD ONC ARIA PLAN PRESCRIBED DOSE PER FRACTION: 2.65 GY
RAD ONC ARIA PLAN PRIMARY REFERENCE POINT: NORMAL
RAD ONC ARIA PLAN TOTAL FRACTIONS PRESCRIBED: 16
RAD ONC ARIA PLAN TOTAL FRACTIONS PRESCRIBED: 4
RAD ONC ARIA PLAN TOTAL FRACTIONS PRESCRIBED: 4
RAD ONC ARIA PLAN TOTAL PRESCRIBED DOSE: 1000 CGY
RAD ONC ARIA PLAN TOTAL PRESCRIBED DOSE: 1000 CGY
RAD ONC ARIA PLAN TOTAL PRESCRIBED DOSE: 4240 CGY
RAD ONC ARIA REFERENCE POINT DOSAGE GIVEN TO DATE: 10.47 GY
RAD ONC ARIA REFERENCE POINT DOSAGE GIVEN TO DATE: 10.47 GY
RAD ONC ARIA REFERENCE POINT DOSAGE GIVEN TO DATE: 43 GY
RAD ONC ARIA REFERENCE POINT DOSAGE GIVEN TO DATE: 52.4 GY
RAD ONC ARIA REFERENCE POINT DOSAGE GIVEN TO DATE: 52.4 GY
RAD ONC ARIA REFERENCE POINT ID: NORMAL
RAD ONC ARIA REFERENCE POINT SESSION DOSAGE GIVEN: 2.5 GY
RAD ONC ARIA REFERENCE POINT SESSION DOSAGE GIVEN: 2.62 GY

## 2023-05-22 PROCEDURE — 77412 RADIATION TX DELIVERY LVL 3: CPT | Performed by: STUDENT IN AN ORGANIZED HEALTH CARE EDUCATION/TRAINING PROGRAM

## 2023-05-22 PROCEDURE — 77387 GUIDANCE FOR RADJ TX DLVR: CPT | Performed by: STUDENT IN AN ORGANIZED HEALTH CARE EDUCATION/TRAINING PROGRAM

## 2023-05-22 NOTE — PROGRESS NOTES
Radiation Oncology  On-Treatment Note      Patient: Ariana Zapata    MRN: [id number]    Attending Physician: Chet Andrade MD     Diagnosis:     ICD-10-CM ICD-9-CM   1. Ductal carcinoma in situ (DCIS) of left breast  D05.12 233.0       Radiation Therapy Visit:  Continue radiation therapy, Dosimetry plan remains acceptable, Films reviewed and remains acceptable, Pain assessed, Pain management planned, Radiation dose schedule reviewed and remains acceptable, Radiation technique remains acceptable and Symptoms within expected range    Radiation Treatments     Active   Reference Points   Rx: L Breast   Most recent treatment: Dose given: 250 cGy (on 5/22/2023)   Total: Dose given: 5,240 cGy   Elapsed Days: 28      calc L Breast   Most recent treatment: Dose given: 269 cGy (on 5/16/2023)   Total: Dose given: 4,300 cGy   Elapsed Days: 22      calc L Breast BV   Most recent treatment: Dose given: 262 cGy (on 5/22/2023)   Total: Dose given: 1,047 cGy   Elapsed Days: 28                    Physical Examination:  Vitals: Blood pressure 108/72, pulse 81, weight 62 kg (136 lb 9.6 oz), SpO2 100 %.  Vitals:    05/22/23 1119   BP: 108/72   Pulse: 81   SpO2: 100%   Weight: 62 kg (136 lb 9.6 oz)     Pain Score    05/22/23 1119   PainSc: 0-No pain       Fully active, able to carry on all pre-disease performance without restriction = 0    We examined the relevant areas: yes  Findings are within the expected range for this stage of treatment: yes  -------------------------------------------------------------------------------------------------------------------    ACTION ITEMS:  Patient tolerating treatment well and as expected for this stage in their treatment and Continue radiation therapy as planned    Estimated Completion Date: 5/22/2023    Follow up in 3 months      Chet Andrade MD  Radiation Oncology

## 2023-05-23 NOTE — PROGRESS NOTES
Radiation Treatment Summary Note      Patient Name: Ariana Zapata  : 1975    Attending Provider: Chet Andrade MD      Diagnosis:     ICD-10-CM ICD-9-CM   1. Ductal carcinoma in situ (DCIS) of left breast  D05.12 233.0       Radiation Start Date: 2023    Radiation Completion Date: 2023      Prescription:     Sequential    1. Site: Left Breast  Laterality: Left  Total Dose: 4240cGy  Dose per Fraction: 265cGy  Total Fractions: 16  Daily or BID: Daily  Modality: Photon  Technique: 3DCRT  Bolus: No       THEN      2.   Site: Lumpectomy Cavity Boost  Laterality: Left  Total Dose: 1000cGy  Dose per Fraction: 250cGy  Total Fractions: 4  Daily or BID: Daily  Modality: Photon  Technique: 3DCRT  Bolus: No      Final Delivered Dose Deviated From Initially Prescribed Dose: No    Concurrent Chemotherapy: No    Patient Tolerated Treatment Without Unexpected Side Effects/Complications: Yes    ECOG: Fully active, able to carry on all pre-disease performance without restriction = 0    Pain Management Plan: None Indicated/PRN OTC    Follow-Up Plan: 3 months    Imaging Ordered for Follow-Up: None/NA        Chet Andrade MD

## 2023-05-24 ENCOUNTER — PATIENT OUTREACH (OUTPATIENT)
Dept: OTHER | Facility: HOSPITAL | Age: 48
End: 2023-05-24
Payer: COMMERCIAL

## 2023-05-24 NOTE — PROGRESS NOTES
Called MsDanae Jodi to see how she was doing. Left a message with my contact information and asked her to call back at her convenience. Will mail survivorship information as well.

## 2023-08-16 ENCOUNTER — OFFICE VISIT (OUTPATIENT)
Dept: RADIATION ONCOLOGY | Facility: HOSPITAL | Age: 48
End: 2023-08-16
Payer: COMMERCIAL

## 2023-08-16 ENCOUNTER — HOSPITAL ENCOUNTER (OUTPATIENT)
Dept: GENERAL RADIOLOGY | Facility: HOSPITAL | Age: 48
Discharge: HOME | End: 2023-08-16
Admitting: STUDENT IN AN ORGANIZED HEALTH CARE EDUCATION/TRAINING PROGRAM
Payer: COMMERCIAL

## 2023-08-16 ENCOUNTER — HOSPITAL ENCOUNTER (OUTPATIENT)
Dept: RADIATION ONCOLOGY | Facility: HOSPITAL | Age: 48
Setting detail: RADIATION/ONCOLOGY SERIES
End: 2023-08-16
Payer: COMMERCIAL

## 2023-08-16 VITALS — DIASTOLIC BLOOD PRESSURE: 75 MMHG | HEART RATE: 77 BPM | SYSTOLIC BLOOD PRESSURE: 108 MMHG | OXYGEN SATURATION: 100 %

## 2023-08-16 DIAGNOSIS — D05.12 DUCTAL CARCINOMA IN SITU (DCIS) OF LEFT BREAST: ICD-10-CM

## 2023-08-16 DIAGNOSIS — D05.12 DUCTAL CARCINOMA IN SITU (DCIS) OF LEFT BREAST: Primary | ICD-10-CM

## 2023-08-16 PROCEDURE — 71046 X-RAY EXAM CHEST 2 VIEWS: CPT

## 2023-08-16 PROCEDURE — G0463 HOSPITAL OUTPT CLINIC VISIT: HCPCS

## 2023-08-16 NOTE — PROGRESS NOTES
[facility] Radiation Oncology   Follow Up    Chief Complaint  DCIS Left Breast        Diagnosis: DCIS Left Breast     Overall Stage 0     pTis: per lumpectomy pathology  cN0: per physical exam, mammography, and MRI breast  cM0: per physical exam      Radiation Completion Date: 5/22/2023        Prescription:      Sequential     Site: Left Breast  Laterality: Left  Total Dose: 4240cGy  Dose per Fraction: 265cGy  Total Fractions: 16  Daily or BID: Daily  Modality: Photon  Technique: 3DCRT  Bolus: No         THEN        2.   Site: Lumpectomy Cavity Boost  Laterality: Left  Total Dose: 1000cGy  Dose per Fraction: 250cGy  Total Fractions: 4  Daily or BID: Daily  Modality: Photon  Technique: 3DCRT  Bolus: No      Interval History:    Ariana Zapata presents for regularly scheduled follow-up approximately 3 months after completion of radiation therapy for DCIS of the left breast.  The patient reports that she has fairly minimal residual breast symptoms, primarily with regards to mobility and flexibility of the left arm and shoulder.  She reports that she has had some intermittent shortness of breath with exertion beyond her baseline.  This is her most bothersome symptom at this point.  She denies any other new or concerning complaint.      Imaging:      No new relevant imaging      Pathology:      No new relevant pathology    Labs:    Lab Results   Component Value Date    CREATININE 0.77 03/17/2023             Problem List:  Patient Active Problem List   Diagnosis    Ductal carcinoma in situ (DCIS) of left breast          Medications:  Current Outpatient Medications on File Prior to Visit   Medication Sig Dispense Refill    amphetamine-dextroamphetamine XR (ADDERALL XR) 20 MG 24 hr capsule Take 1 capsule by mouth Every Morning TO HOLD 2 DAYS PRIOR TO OR      levothyroxine (SYNTHROID, LEVOTHROID) 200 MCG tablet Take 1 tablet by mouth Daily.      Linzess 290 MCG capsule capsule Take 1 capsule by mouth Daily.      mometasone  "(ELOCON) 0.1 % ointment Apply 1 application topically to the appropriate area as directed Daily. 45 g 1    omeprazole (priLOSEC) 20 MG capsule Take 1 capsule by mouth Daily.      ondansetron ODT (ZOFRAN-ODT) 4 MG disintegrating tablet Place 1 tablet under the tongue As Needed for Nausea or Vomiting. 30 tablet 0    Probiotic Product capsule Take 1 tablet by mouth Daily.      spironolactone (ALDACTONE) 100 MG tablet Take 1 tablet by mouth Daily.      vitamin D (ERGOCALCIFEROL) 1.25 MG (88830 UT) capsule capsule Take 1 capsule by mouth Every 7 (Seven) Days. TUESDAYS       No current facility-administered medications on file prior to visit.          Allergies:  Allergies   Allergen Reactions    Amoxicillin-Pot Clavulanate Nausea And Vomiting    Codeine Nausea And Vomiting           Vital Signs:  /75   Pulse 77   SpO2 100%   Estimated body mass index is 23.45 kg/mý as calculated from the following:    Height as of 4/12/23: 162.6 cm (64\").    Weight as of 5/22/23: 62 kg (136 lb 9.6 oz).  Pain Score    08/16/23 1015   PainSc:   2   PainLoc: Breast  Comment: tender & sensitive         ECOG: Restricted in physically strenuous activity but ambulatory and able to carry out work of a light or sedentary nature, e.g., light house work, office work = 1      Physical Exam  Vitals reviewed.   Constitutional:       General: She is not in acute distress.     Appearance: Normal appearance.   HENT:      Head: Normocephalic and atraumatic.   Eyes:      Extraocular Movements: Extraocular movements intact.      Pupils: Pupils are equal, round, and reactive to light.   Pulmonary:      Effort: Pulmonary effort is normal.   Abdominal:      General: Abdomen is flat.      Palpations: Abdomen is soft.   Musculoskeletal:      Cervical back: Normal range of motion.   Skin:     General: Skin is warm and dry.   Neurological:      General: No focal deficit present.      Mental Status: She is alert and oriented to person, place, and time. "   Psychiatric:         Mood and Affect: Mood normal.         Behavior: Behavior normal.        Result Review :  The following data was reviewed by: Chet Andrade MD on 08/16/2023:  Labs: Last Creatinine            Diagnoses and all orders for this visit:    1. Ductal carcinoma in situ (DCIS) of left breast (Primary)  -     XR Chest PA & Lateral; Future  -     Ambulatory Referral to Physical Therapy Evaluate and treat, Breast care; Stretching (post xrt left breast), Strengthening      Assessment:    Ariana Zapata presents for regularly scheduled follow-up approximately 3 months after completion of radiation therapy for DCIS of the left breast.  The patient reports that she has fairly minimal residual breast symptoms, primarily with regards to mobility and flexibility of the left arm and shoulder.  She reports that she has had some intermittent shortness of breath with exertion beyond her baseline.  This is her most bothersome symptom at this point.  She denies any other new or concerning complaint.    I discussed the patient's breathing symptoms in detail.  It would be unlikely but not impossible to have radiation pneumonitis from her breast treatment.  To investigate this further I will order a chest x-ray.  Her symptoms are relatively mild to moderate as she is otherwise extremely healthy and physically fit.  I encouraged her to reach out if there was any significant worsening in her symptoms in the interim.    I also discussed referral to physical therapy to help with her ongoing shoulder mobility issues and pain.  I also placed a referral to survivorship clinic.      Plan:    -Chest x-ray ordered to evaluate ongoing shortness of breath and possible radiation pneumonitis  - Referral to survivorship and physical therapy       I spent 30 minutes caring for Ariana on this date of service. This time includes time spent by me in the following activities:preparing for the visit, reviewing tests, obtaining and/or  reviewing a separately obtained history, documenting information in the medical record, independently interpreting results and communicating that information with the patient/family/caregiver, and care coordination  Follow Up   No follow-ups on file.  Patient was given instructions and counseling regarding her condition or for health maintenance advice. Please see specific information pulled into the AVS if appropriate.     Chet Andrade MD

## 2023-08-24 ENCOUNTER — TELEPHONE (OUTPATIENT)
Dept: OTHER | Facility: HOSPITAL | Age: 48
End: 2023-08-24
Payer: COMMERCIAL

## 2023-08-24 NOTE — TELEPHONE ENCOUNTER
Psychiatric [facility]IDISCIPLIN[facility]  SURVIVORSHIP SERVICES CARE COORDINATION NOTE  PHONE      Call placed to patient   RE: open referral to survivorship treatment summary visit.    Spoke with  patient.  Would like information mailed to her.  Will call back if wishes to schedule.      Introduced myself and reviewed purpose and goals of survivorship treatment summary visit as well as what to expect..    Patient mailed survivorship program informational brochure.    Patient encouraged to call the office at any point for additional information, resources or support.

## 2023-09-08 ENCOUNTER — TRANSCRIBE ORDERS (OUTPATIENT)
Dept: ADMINISTRATIVE | Facility: HOSPITAL | Age: 48
End: 2023-09-08
Payer: COMMERCIAL

## 2023-09-08 DIAGNOSIS — E07.9 THYROID TROUBLE: Primary | ICD-10-CM

## 2023-09-08 DIAGNOSIS — E06.3 CHRONIC LYMPHOCYTIC THYROIDITIS: ICD-10-CM

## 2023-10-02 ENCOUNTER — APPOINTMENT (OUTPATIENT)
Dept: WOMENS IMAGING | Facility: HOSPITAL | Age: 48
End: 2023-10-02
Payer: COMMERCIAL

## 2023-10-02 PROCEDURE — 76642 ULTRASOUND BREAST LIMITED: CPT | Performed by: RADIOLOGY

## 2023-10-03 ENCOUNTER — HOSPITAL ENCOUNTER (OUTPATIENT)
Dept: ULTRASOUND IMAGING | Facility: HOSPITAL | Age: 48
Discharge: HOME | End: 2023-10-03
Admitting: FAMILY MEDICINE
Payer: COMMERCIAL

## 2023-10-03 DIAGNOSIS — E07.9 THYROID TROUBLE: ICD-10-CM

## 2023-10-03 DIAGNOSIS — E06.3 CHRONIC LYMPHOCYTIC THYROIDITIS: ICD-10-CM

## 2023-10-03 PROCEDURE — 76536 US EXAM OF HEAD AND NECK: CPT

## 2023-11-10 ENCOUNTER — OFFICE VISIT (OUTPATIENT)
Dept: RADIATION ONCOLOGY | Facility: HOSPITAL | Age: 48
End: 2023-11-10
Payer: COMMERCIAL

## 2023-11-10 ENCOUNTER — OFFICE VISIT (OUTPATIENT)
Dept: ONCOLOGY | Facility: CLINIC | Age: 48
End: 2023-11-10
Payer: COMMERCIAL

## 2023-11-10 ENCOUNTER — HOSPITAL ENCOUNTER (OUTPATIENT)
Dept: RADIATION ONCOLOGY | Facility: HOSPITAL | Age: 48
Setting detail: RADIATION/ONCOLOGY SERIES
End: 2023-11-10
Payer: COMMERCIAL

## 2023-11-10 ENCOUNTER — LAB (OUTPATIENT)
Dept: LAB | Facility: HOSPITAL | Age: 48
End: 2023-11-10
Payer: COMMERCIAL

## 2023-11-10 VITALS
OXYGEN SATURATION: 97 % | WEIGHT: 139.6 LBS | HEART RATE: 87 BPM | BODY MASS INDEX: 23.83 KG/M2 | TEMPERATURE: 98.4 F | DIASTOLIC BLOOD PRESSURE: 74 MMHG | RESPIRATION RATE: 16 BRPM | HEIGHT: 64 IN | SYSTOLIC BLOOD PRESSURE: 110 MMHG

## 2023-11-10 VITALS
BODY MASS INDEX: 23.64 KG/M2 | OXYGEN SATURATION: 100 % | HEART RATE: 80 BPM | WEIGHT: 137.8 LBS | SYSTOLIC BLOOD PRESSURE: 101 MMHG | DIASTOLIC BLOOD PRESSURE: 69 MMHG

## 2023-11-10 DIAGNOSIS — D05.12 DUCTAL CARCINOMA IN SITU (DCIS) OF LEFT BREAST: Primary | ICD-10-CM

## 2023-11-10 DIAGNOSIS — D05.12 DUCTAL CARCINOMA IN SITU (DCIS) OF LEFT BREAST: ICD-10-CM

## 2023-11-10 LAB
ALBUMIN SERPL-MCNC: 3.7 G/DL (ref 3.5–5.2)
ALBUMIN/GLOB SERPL: 2.3 G/DL
ALP SERPL-CCNC: 46 U/L (ref 39–117)
ALT SERPL W P-5'-P-CCNC: 27 U/L (ref 1–33)
ANION GAP SERPL CALCULATED.3IONS-SCNC: 9.5 MMOL/L (ref 5–15)
AST SERPL-CCNC: 21 U/L (ref 1–32)
BASOPHILS # BLD AUTO: 0.01 10*3/MM3 (ref 0–0.2)
BASOPHILS NFR BLD AUTO: 0.2 % (ref 0–1.5)
BILIRUB SERPL-MCNC: 0.6 MG/DL (ref 0–1.2)
BUN SERPL-MCNC: 12 MG/DL (ref 6–20)
BUN/CREAT SERPL: 16.2 (ref 7–25)
CALCIUM SPEC-SCNC: 8.7 MG/DL (ref 8.6–10.5)
CHLORIDE SERPL-SCNC: 105 MMOL/L (ref 98–107)
CO2 SERPL-SCNC: 24.5 MMOL/L (ref 22–29)
CREAT SERPL-MCNC: 0.74 MG/DL (ref 0.6–1.1)
DEPRECATED RDW RBC AUTO: 40.2 FL (ref 37–54)
EGFRCR SERPLBLD CKD-EPI 2021: 99.9 ML/MIN/1.73
EOSINOPHIL # BLD AUTO: 0.05 10*3/MM3 (ref 0–0.4)
EOSINOPHIL NFR BLD AUTO: 1 % (ref 0.3–6.2)
ERYTHROCYTE [DISTWIDTH] IN BLOOD BY AUTOMATED COUNT: 11.9 % (ref 12.3–15.4)
GLOBULIN UR ELPH-MCNC: 1.6 GM/DL
GLUCOSE SERPL-MCNC: 82 MG/DL (ref 65–99)
HCT VFR BLD AUTO: 39 % (ref 34–46.6)
HGB BLD-MCNC: 13.5 G/DL (ref 12–15.9)
IMM GRANULOCYTES # BLD AUTO: 0.01 10*3/MM3 (ref 0–0.05)
IMM GRANULOCYTES NFR BLD AUTO: 0.2 % (ref 0–0.5)
LYMPHOCYTES # BLD AUTO: 1.34 10*3/MM3 (ref 0.7–3.1)
LYMPHOCYTES NFR BLD AUTO: 25.6 % (ref 19.6–45.3)
MCH RBC QN AUTO: 31.9 PG (ref 26.6–33)
MCHC RBC AUTO-ENTMCNC: 34.6 G/DL (ref 31.5–35.7)
MCV RBC AUTO: 92.2 FL (ref 79–97)
MONOCYTES # BLD AUTO: 0.36 10*3/MM3 (ref 0.1–0.9)
MONOCYTES NFR BLD AUTO: 6.9 % (ref 5–12)
NEUTROPHILS NFR BLD AUTO: 3.47 10*3/MM3 (ref 1.7–7)
NEUTROPHILS NFR BLD AUTO: 66.1 % (ref 42.7–76)
NRBC BLD AUTO-RTO: 0 /100 WBC (ref 0–0.2)
PLATELET # BLD AUTO: 240 10*3/MM3 (ref 140–450)
PMV BLD AUTO: 9.6 FL (ref 6–12)
POTASSIUM SERPL-SCNC: 4.1 MMOL/L (ref 3.5–5.2)
PROT SERPL-MCNC: 5.3 G/DL (ref 6–8.5)
RBC # BLD AUTO: 4.23 10*6/MM3 (ref 3.77–5.28)
SODIUM SERPL-SCNC: 139 MMOL/L (ref 136–145)
WBC NRBC COR # BLD: 5.24 10*3/MM3 (ref 3.4–10.8)

## 2023-11-10 PROCEDURE — 36415 COLL VENOUS BLD VENIPUNCTURE: CPT

## 2023-11-10 PROCEDURE — 80053 COMPREHEN METABOLIC PANEL: CPT

## 2023-11-10 PROCEDURE — G0463 HOSPITAL OUTPT CLINIC VISIT: HCPCS

## 2023-11-10 PROCEDURE — 85025 COMPLETE CBC W/AUTO DIFF WBC: CPT

## 2023-11-10 RX ORDER — TIZANIDINE 4 MG/1
4 TABLET ORAL
COMMUNITY
Start: 2023-11-01

## 2023-11-10 RX ORDER — SEMAGLUTIDE 1.34 MG/ML
INJECTION, SOLUTION SUBCUTANEOUS
COMMUNITY
Start: 2023-10-30

## 2023-11-10 NOTE — PROGRESS NOTES
Subjective     REASON FOR CONSULTATION: High-grade DCIS ER/CO negative left breast postlumpectomy  Provide an opinion on any further workup or treatment                             REQUESTING PHYSICIAN: Lindsay Arnold, MD Rosenberg Reyes, MD      History of Present Illness patient is a 48-year-old premenopausal woman with a high-grade DCIS ER/CO negative returns having weaned off her estrogen replacement therapy per her gynecologist successfully.  She feels fatigued but thankfully she is able to function.  Her menstrual cycles have resumed and are fairly regular    Her genetic testing 47 gene was also totally negative which is a little reassuring because her mother had breast cancer at age 42 and makes me a little more comfortable with doing nothing for the ER/CO negative DCIS but I still think she should stay off hormone replacement therapy and she is agreeable to this    At this point we will watch closely with imaging and MRIs as needed she is scheduled to see Dr. Hickman in January and I will schedule her mammogram and Dr. Hickman can set up her MRIs as we do not need to follow her at this time    Past Medical History:   Diagnosis Date    ADD (attention deficit disorder)     Breast cancer, left 03/2023    GERD (gastroesophageal reflux disease)     Hypothyroidism     IBS (irritable bowel syndrome)     PONV (postoperative nausea and vomiting)         Past Surgical History:   Procedure Laterality Date    BREAST BIOPSY Left 02/20/2023    BREAST CYST EXCISION Right     BENIGN    BREAST LUMPECTOMY Left 3/24/2023    Procedure: left breast wire localized lumpectomy;  Surgeon: Janny Hickman MD;  Location: [facility];  Service: General;  Laterality: Left;    CHOLECYSTECTOMY N/A 01/01/1993    COLONOSCOPY      D & C HYSTEROSCOPY N/A 04/16/2012    followed by a laparoscopy with destruction of endometriotic lesions in the cul-de-sac and on t he posterior uterine wall  with chromotubation and the fallopian tubes we open bilaterally    MAMMO STEREOTACTIC BREAST BIOPSY 1ST W WO DEVICE Left 2023    abnormal    OVARIAN CYST REMOVAL N/A 2012    SINUS SURGERY     Oncologic history  atient is a 48-year-old female with history of attention deficit disorder and hypothyroidism and irritable bowel syndrome was noted on her most recent mammogram to have an abnormality in the left breast that warranted further diagnostic imaging.Patient had 3 areas of abnormality which on ultrasound showed a 6 x 10 mm mass in the upper outer quadrant of the left breast as well as atypical calcifications and the patient underwent biopsy of 3 separate areas in the left breast only 1 of which was abnormal showing high-grade DCIS measuring 1.5 mm ER/OR negative with a high Ki-67 of 65%.    Patient underwent bilateral breast MRIs which were fairly unremarkable except for the biopsy site and then proceeded with lumpectomy On 3/24/2023 at which time there was 1.1mm residual DCIS in the breast  with clear margins    She is here today to discuss prevention    Patient reports a previous right-sided lumpectomy many years ago which was reportedly benign and we do not have these path reports available    She is  0 para 0 and underwent 4-5 trials of in vitro which were nonsuccessful  Menarche was at age 12 menopause not yet achieved her last menstrual cycle was in October but after stopping her hormone replacement she had a.  Menstrual period 2 weeks ago    She has been on progesterone plus testosterone through her gynecologist for the last 7 to 8 years and since stopping this month ago she is feeling extremely tired and fatigued sleeps all the time and has no energy and feels like she is in a brain fog-she is wanting to know when she can go back on her hormone therapy    Family history is positive for mother with breast cancer at age 42 she is alive and well many years later her father has lung cancer  related to smoking there is possible family history of colon cancer    She has never had a DVT heart attack or stroke  She is on fairly high doses of Synthroid for hypothyroidism and I do not see that her thyroid functions have been checked since 2021    She has been on Adderall for many years at a dose of 20 mg for her ADD    We discussed  proceeding with radiation therapy  We will check thyroid profile and order a 47 gene extended panel to be reflexed from her original genetic testing     Discussed with gynecologist going back on progesterone only and slowly tapering off over the next 6 months     I explained to Ariana that although her DCIS was ER/NE negative her family history with her mother having a hormone positive breast cancer at age 42 is very concerning despite the negative genetic testing and we would like as much as possible to get her off her hormonal therapy as long as she can tolerate    She understands and will work with her gynecologist to see if she can wean off the progesterone and hold off on the testosterone      Current Outpatient Medications on File Prior to Visit   Medication Sig Dispense Refill    amphetamine-dextroamphetamine XR (ADDERALL XR) 20 MG 24 hr capsule Take 1 capsule by mouth Every Morning TO HOLD 2 DAYS PRIOR TO OR      levothyroxine (SYNTHROID, LEVOTHROID) 200 MCG tablet Take 1 tablet by mouth Daily.      Linzess 290 MCG capsule capsule Take 1 capsule by mouth Daily.      mometasone (ELOCON) 0.1 % ointment Apply 1 application topically to the appropriate area as directed Daily. 45 g 1    omeprazole (priLOSEC) 20 MG capsule Take 1 capsule by mouth Daily.      ondansetron ODT (ZOFRAN-ODT) 4 MG disintegrating tablet Place 1 tablet under the tongue As Needed for Nausea or Vomiting. 30 tablet 0    Probiotic Product capsule Take 1 tablet by mouth Daily.      spironolactone (ALDACTONE) 100 MG tablet Take 1 tablet by mouth Daily.      vitamin D (ERGOCALCIFEROL) 1.25 MG (02919 UT)  capsule capsule Take 1 capsule by mouth Every 7 (Seven) Days. TUESDAYS       No current facility-administered medications on file prior to visit.        ALLERGIES:    Allergies   Allergen Reactions    Amoxicillin-Pot Clavulanate Nausea And Vomiting    Codeine Nausea And Vomiting        Social History     Socioeconomic History    Marital status:      Spouse name: Chavez    Number of children: 0   Tobacco Use    Smoking status: Never    Smokeless tobacco: Never   Vaping Use    Vaping Use: Never used   Substance and Sexual Activity    Alcohol use: Yes     Comment: social    Drug use: Never    Sexual activity: Defer        Family History   Problem Relation Age of Onset    Hypertension Mother     Heart disease Mother     Breast cancer Mother         mid 40s    Seizures Mother     Stroke Mother     Hypertension Father     Diabetes Father     Lung cancer Father     Enlarged spleen Father     Spleen removed Father     Hyperlipidemia Paternal Grandmother     Stroke Paternal Grandmother     Breast cancer Paternal Great-Grandmother     Malig Hyperthermia Neg Hx         Review of Systems   Constitutional:  Positive for fatigue (Extremely sleepy since stopping hormones).   Psychiatric/Behavioral:  Positive for decreased concentration (Since stopping hormones).    All other systems reviewed and are negative.       Objective     There were no vitals filed for this visit.        11/10/2023    11:49 AM   Current Status   ECOG score 0       Physical Exam    CONSTITUTIONAL:  Vital signs reviewed.  No distress, looks comfortable.  EYES:  Conjunctivae and lids unremarkable.  PERRLA  EARS,NOSE,MOUTH,THROAT:  Ears and nose appear unremarkable.  Lips, teeth, gums appear unremarkable.  RESPIRATORY:  Normal respiratory effort.  Lungs clear to auscultation bilaterally.  BREASTS: Right breast is benign with a lumpectomy scar at the upper quadrant at 12:00 left breast shows a incision in the upper outer quadrant  well-healed  CARDIOVASCULAR:  Normal S1, S2.  No murmurs rubs or gallops.  No significant lower extremity edema.  GASTROINTESTINAL: Abdomen appears unremarkable.  Nontender.  No hepatomegaly.  No splenomegaly.  LYMPHATIC:  No cervical, supraclavicular, axillary lymphadenopathy.  SKIN:  Warm.  No rashes.  PSYCHIATRIC:  Normal judgment and insight.  Normal mood and affect.  I have reexamined the patient and the results are consistent with the previously documented exam. Eduard Tse MD       RECENT LABS:  Hematology WBC   Date Value Ref Range Status   04/12/2023 7.23 3.40 - 10.80 10*3/mm3 Final   05/18/2021 5.15 4.5 - 11.0 10*3/uL Final     RBC   Date Value Ref Range Status   04/12/2023 4.42 3.77 - 5.28 10*6/mm3 Final   05/18/2021 4.07 4.0 - 5.2 10*6/uL Final     Hemoglobin   Date Value Ref Range Status   04/12/2023 13.8 12.0 - 15.9 g/dL Final   05/18/2021 12.8 12.0 - 16.0 g/dL Final     Hematocrit   Date Value Ref Range Status   04/12/2023 39.3 34.0 - 46.6 % Final   05/18/2021 38.9 36.0 - 46.0 % Final     Platelets   Date Value Ref Range Status   04/12/2023 252 140 - 450 10*3/mm3 Final   05/18/2021 266 140 - 440 10*3/uL Final      SPECIMEN  Procedure Excision (less than total mastectomy)  Specimen Laterality Left  .  TUMOR  Tumor Site Upper outer quadrant  Histologic Type Ductal carcinoma in situ  Size (Extent) of DCIS Estimated size (extent) of DCIS is at least (Millimeters): 1.5 (greatest  size in core biopsy) mm  Architectural Patterns Cribriform  Solid  Nuclear Grade Grade III (high)  Necrosis Present, focal (small foci or single cell necrosis)  Microcalcifications Not identified  .  MARGINS  Margin Status All margins negative for DCIS  Distance from DCIS to Closest Margin 2 mm  Closest Margin(s) to DCIS Posterior  .  REGIONAL LYMPH NODES  Regional Lymph Node Status Not applicable (no regional lymph nodes submitted or found)  .  PATHOLOGIC STAGE CLASSIFICATION (pTNM, AJCC 8th Edition)  Reporting of pT,  pN, and (when applicable) pM categories is based on information available to the pathologist at the time the report is issued. As  per the AJCC (Chapter 1, 8th Ed.) it is the managing physician’s responsibility to establish the final pathologic stage based upon all pertinent  information, including but potentially not limited to this pathology report.  pT Category pTis (DCIS)  pN Category pN not assigned (no nodes submitted or found)  .  Breast Biomarker Testing Performed on Previous Biopsy  Testing Performed on Case Number Outside OPUS [id number], report not available for review  .ER/[place] 0        Assessment & Plan   1.pTis Nx high-grade DCIS left breast ER/KY negative post lumpectomy  Radiation appointment scheduled  Currently off her hormone therapy with progesterone and testosterone which she wants very badly to resume because of fatigue loss of concentration and excessive sleepiness  Weaned off hormone therapy as of 11/23 menstrual cycles regular  No prevention planned and  genetic testing 47 gene was also negative    2.  Hypothyroidism on treatment-TSH low and Synthroid high in 4/23 adjustments per primary care    3.  ADHD on Adderall 20 mg XR daily    Plan  1.  Close follow-up with imaging including MRIs as needed  She will follow-up with Dr. Hickman and we do not need to follow at this time.  I told her that I would feel it is safer that she stay off hormones especially since her mother had a hormone positive breast cancer  She is due for mammogram in December and I have scheduled this for her

## 2023-11-10 NOTE — PROGRESS NOTES
[facility] Radiation Oncology   Follow Up    Chief Complaint  DCIS Left Breast        Diagnosis: DCIS Left Breast     Overall Stage 0     pTis: per lumpectomy pathology  cN0: per physical exam, mammography, and MRI breast  cM0: per physical exam        Radiation Completion Date: 5/22/2023        Prescription:      Sequential     Site: Left Breast  Laterality: Left  Total Dose: 4240cGy  Dose per Fraction: 265cGy  Total Fractions: 16  Daily or BID: Daily  Modality: Photon  Technique: 3DCRT  Bolus: No         THEN        2.   Site: Lumpectomy Cavity Boost  Laterality: Left  Total Dose: 1000cGy  Dose per Fraction: 250cGy  Total Fractions: 4  Daily or BID: Daily  Modality: Photon  Technique: 3DCRT  Bolus: No        Interval History:    Ariana Zapata presents for regularly scheduled follow-up approximately 6 months after completion of adjuvant radiation therapy for DCIS of the left breast.  The patient tolerated treatment well and has done fairly well in the interim with the exception that she has lingering fatigue.  The patient is not on any endocrine therapy as she was ER/UT negative.  She has no new or concerning complaints with regards to radiation toxicity.      Imaging:      No new relevant imaging    Pathology:      No new relevant pathology    Labs:    Lab Results   Component Value Date    CREATININE 0.74 11/10/2023             Problem List:  Patient Active Problem List   Diagnosis    Ductal carcinoma in situ (DCIS) of left breast          Medications:  Current Outpatient Medications on File Prior to Visit   Medication Sig Dispense Refill    amphetamine-dextroamphetamine XR (ADDERALL XR) 20 MG 24 hr capsule Take 1 capsule by mouth Every Morning TO HOLD 2 DAYS PRIOR TO OR      levothyroxine (SYNTHROID, LEVOTHROID) 200 MCG tablet Take 1 tablet by mouth Daily.      Linzess 290 MCG capsule capsule Take 1 capsule by mouth Daily.      mometasone (ELOCON) 0.1 % ointment Apply 1 application topically to the appropriate area  "as directed Daily. 45 g 1    omeprazole (priLOSEC) 20 MG capsule Take 1 capsule by mouth Daily.      ondansetron ODT (ZOFRAN-ODT) 4 MG disintegrating tablet Place 1 tablet under the tongue As Needed for Nausea or Vomiting. 30 tablet 0    Probiotic Product capsule Take 1 tablet by mouth Daily.      spironolactone (ALDACTONE) 100 MG tablet Take 1 tablet by mouth Daily.      vitamin D (ERGOCALCIFEROL) 1.25 MG (28607 UT) capsule capsule Take 1 capsule by mouth Every 7 (Seven) Days. TUESDAYS       No current facility-administered medications on file prior to visit.          Allergies:  Allergies   Allergen Reactions    Amoxicillin-Pot Clavulanate Nausea And Vomiting    Codeine Nausea And Vomiting           Vital Signs:  /69   Pulse 80   Wt 62.5 kg (137 lb 12.8 oz)   SpO2 100%   BMI 23.64 kg/m²   Estimated body mass index is 23.64 kg/m² as calculated from the following:    Height as of an earlier encounter on 11/10/23: 162.6 cm (64.02\").    Weight as of this encounter: 62.5 kg (137 lb 12.8 oz).  Pain Score    11/10/23 1318   PainSc: 0-No pain         ECOG: Fully active, able to carry on all pre-disease performance without restriction = 0    Physical Exam  Vitals reviewed.   Constitutional:       General: She is not in acute distress.     Appearance: Normal appearance.   HENT:      Head: Normocephalic and atraumatic.   Eyes:      Extraocular Movements: Extraocular movements intact.      Pupils: Pupils are equal, round, and reactive to light.   Pulmonary:      Effort: Pulmonary effort is normal.   Abdominal:      General: Abdomen is flat.      Palpations: Abdomen is soft.   Musculoskeletal:      Cervical back: Normal range of motion.   Skin:     General: Skin is warm and dry.   Neurological:      General: No focal deficit present.      Mental Status: She is alert and oriented to person, place, and time.   Psychiatric:         Mood and Affect: Mood normal.         Behavior: Behavior normal.        Right Breast: No " concerning nodules or axillary lymphadenopathy.  No nipple discharge or inversion.  Left Breast: Well-healed lumpectomy scar, no concerning nodules or axillary lymphadenopathy.  No nipple discharge or inversion.    Result Review :  The following data was reviewed by: Chet Andrade MD on 11/10/2023:  Labs: Last Creatinine          Diagnoses and all orders for this visit:    1. Ductal carcinoma in situ (DCIS) of left breast (Primary)        Assessment:    Ariana Zapata presents for regularly scheduled follow-up approximately 6 months after completion of adjuvant radiation therapy for DCIS of the left breast.  The patient tolerated treatment well and has done fairly well in the interim with the exception that she has lingering fatigue.  The patient is not on any endocrine therapy as she was ER/KY negative.  She has no new or concerning complaints with regards to radiation toxicity.    I met with the patient and performed a detailed physical exam.  There were no concerning lesions in either breast.  The patient has lingering fatigue which is bothersome to her which could in part be due to lingering effects of radiation.  No specific management indicated.  Patient can follow-up with me in 1 year.    Plan:    -Follow-up in 1 year       I spent 20 minutes caring for Ariana on this date of service. This time includes time spent by me in the following activities:preparing for the visit, reviewing tests, obtaining and/or reviewing a separately obtained history, documenting information in the medical record, independently interpreting results and communicating that information with the patient/family/caregiver, and care coordination  Follow Up   No follow-ups on file.  Patient was given instructions and counseling regarding her condition or for health maintenance advice. Please see specific information pulled into the AVS if appropriate.     Chet Andrade MD

## 2024-01-15 ENCOUNTER — OFFICE VISIT (OUTPATIENT)
Dept: SURGERY | Facility: CLINIC | Age: 49
End: 2024-01-15
Payer: COMMERCIAL

## 2024-01-15 VITALS
WEIGHT: 140 LBS | DIASTOLIC BLOOD PRESSURE: 66 MMHG | SYSTOLIC BLOOD PRESSURE: 108 MMHG | BODY MASS INDEX: 23.9 KG/M2 | HEIGHT: 64 IN

## 2024-01-15 DIAGNOSIS — Z85.3 HISTORY OF BREAST CANCER: Primary | ICD-10-CM

## 2024-01-15 PROCEDURE — 99212 OFFICE O/P EST SF 10 MIN: CPT | Performed by: STUDENT IN AN ORGANIZED HEALTH CARE EDUCATION/TRAINING PROGRAM

## 2024-01-15 NOTE — PROGRESS NOTES
General Surgery Breast Cancer History and Physical Exam      Summary:    Araina Zapata is a 48 y.o. 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       A multidisciplinary plan has been formulated for the patient:    (1) Breast Surgical Oncology:  -Invitae 47 panel genetic testing: negative   -S/p left breast wire localized lumpectomy 3/2023.   -Annual mammogram this week. Follow up results.   -Discussed vitamin E for breast pain.  If no improvement in her inframammary discomfort, will check an ultrasound in 4 to 6 weeks.  She will call to schedule.  -Follow up in 1 year.     (2) Medical Oncology:  -Follows with Dr. Tse. No plans for endocrine therapy.  Does not need to follow-up further.     (3) Radiation Oncology:  -Completed radiation therapy with Dr. Andrade.     Referring Provider: No ref. provider found     Chief Complaint: abnormal breast imaging     History of Present Illness: Ms. Ariana Zapata is a 48 y.o. year old lady, seen at the request of No ref. provider found for a new diagnosis of left breast cancer.       This was initially detected as an imaging abnormality. She has had annual mammograms each year. She had a prior right breast biopsy in [facility] (2004) that was benign. She then had another breast biopsy on the [facility] that was benign. Her work-up is detailed in the oncologic history below.      She denies any  breast lumps, pain, skin changes, or nipple discharge. She has a family history of breast cancer in a great grandmother and her mother at age 42. She denies any family history of ovarian cancer.      4/12/2023 she presents today for follow-up.  She is overall doing well.  Her pain from surgery has subsided but she does complain of some pain in her inframammary line far from her incision site.  She is complaining of quite a bit of fatigue that she believes is related to stopping her hormone therapy.    1/15/2024 She  presents today for yearly follow up.  She has done well since I last saw her.  She completed radiation therapy with no issues.  She is not on any endocrine therapy.  She has a mammogram due this week.  She complains of some pain and soreness near her inframammary fold in the lateral left breast but otherwise has no complaints.  She did just start vitamin E.     Workup of Current Diagnosis:    12/13/2022 bilateral Screening Mammogram:  Finding 1: There are new calcifications seen in the posterior one third upper outer region of the left breast.  Require additional evaluation.  Finding 2: There is a focal asymmetry seen in the upper outer region of the left breast.  Requires additional evaluation.  BI-RADS Category 0     1/19/2023 left breast diagnostic Mammogram with Ultrasound:   Finding 1: On present exam there is a focal asymmetry in the upper outer region of the left breast 5 cm from the nipple.  On ultrasound there is an irregular parallel hypoechoic mass with angular margins measuring 6 x 7 x 10 mm in the upper outer region of the left breast 5 cm from the nipple.  Suspicious.  Ultrasound-guided biopsy is recommended.  Finding 2: On present exam there are 3 groups of coarse heterogeneous calcifications with grouped distribution in the posterior one third upper outer region of the left breast.  Suspicious.  Stereotactic biopsy is recommended.  2 site stereotactic biopsy is recommended.  BI-RADS Category 4B     2/20/2023 left breast ultrasound-guided biopsy:   Left breast at 1:00 was imaged and the mass was localized.  15 cores were obtained.  A mini cork tissue marker was placed.  Clip was in the expected position.  Pathology returned benign and concordant.     2/20/2023 left breast stereotactic biopsy:  The left breast in the upper outer quadrant was isolated and the calcifications were localized.  12 core specimens were obtained.  A Top-Hat clip was placed.  Clip was in the expected position.  Pathology is  benign and concordant     2/20/2023 Pathology:   1. Site A, left breast, upper outer quadrant, stereotactic biopsy:   Benign fibrofatty breast tissue  2.  Site B, left breast, upper outer quadrant, stereotactic biopsy:  DCIS, grade 3  3.  Mass, left breast tissue at 1:00, upper outer quadrant, ultrasound biopsy:  Benign fibrofatty breast tissue     3/2/2023 Bilateral Breast MRI   IMPRESSION:  1. Biopsy-proven site of DCIS in the posterior one-third upper-outer quadrant of the left breast represented by the hourglass-shaped metallic clip. No suspicious enhancement is seen at this location or in other areas of the left breast. There is no evidence for left axillary adenopathy. If breast conservation therapy is desired then surgical excision of any remaining suspicious microcalcifications is recommended.  2. There are no findings suspicious for malignancy in the right breast.  BI-RADS Category 6: Known biopsy-proven malignancy.     3/24/2023 Left breast wire localized lumpectomy  Final Diagnosis   1. Breast, Left, Lumpectomy (10 grams):               A. Benign breast parenchyma with fibroadenomatoid hyperplasia.               B. Top hat clip and biopsy site change identified.      2. Breast, Left Additional Superior Margin, Inked and Oriented Excision:               A. Benign fibroadipose tissue.     3. Breast, Left Additional Medial Margin, Inked and Oriented Excision:                 A. Benign breast parenchyma with fibroadenomatoid change.     4. Breast, Left Additional Lateral Margin, Inked and Oriented Excision:               A. Benign breast parenchyma.     5. Breast, Left Additional Inferior Margin, Inked and Oriented Excision:               A. Benign breast parenchyma with ductal hyperplasia of the usual type.     6. Breast, Left Additional Posterior Margin, Inked and Oriented Excision:               A. High grade ductal carcinoma in-situ (DCIS), 1.1 mm maximally, margin free. See synoptic template.                B. Hourglass clip and biopsy site change identified.               C. Clustered microcysts, columnar cell change and ductal hyperplasia of the usual type.               D. Scattered microcalcifications identified.               E. Benign skeletal muscle.     7. Breast, Left Additional Anterior Margin, Inked and Oriented Excision:               A. Benign breast parenchyma with fibroadenomatoid change.      10/2/2023 Left Breast US:   There is a hypoechoic area and biopsy clip seen in the left breast at 1:00 5cm FN. This smaller than the pre-biopsy images and is at the location of the prior benign biopsy. This is in close proximity to the interval lumpectomy. Hypoechoic area and biopsy clip in the left breast is benign-negative.   BIRADS Category 2    Gynecologic History:   . P:0 AB:0  Age at first childbirth: N/A  Lactation/How long: N/A  Age at menarche: 11-12  Age at menopause: 45  Total years of oral contraceptive use: 20 years previously  Total years of hormone replacement therapy: none     Past Medical History:   Hypothyroidism   GERD     Past Surgical History:    Colonoscopy up to date  Cholecystectomy   D&C  Ovarian cystectomy     Family History:    As above     Social History:  Denies tobacco use  Occasional alcohol use     Allergies:        Allergies   Allergen Reactions    Amoxicillin-Pot Clavulanate Nausea And Vomiting    Codeine Nausea And Vomiting      Medications:      Current Outpatient Medications:     amphetamine-dextroamphetamine XR (ADDERALL XR) 20 MG 24 hr capsule, Take 1 capsule by mouth Every Morning TO HOLD 2 DAYS PRIOR TO OR, Disp: , Rfl:     Chlorhexidine Gluconate Cloth 2 % pads, Apply  topically. PRIOR TO OR, Disp: , Rfl:     levothyroxine (SYNTHROID, LEVOTHROID) 200 MCG tablet, Take 1 tablet by mouth Daily., Disp: , Rfl:     Linzess 290 MCG capsule capsule, Take 1 capsule by mouth Daily., Disp: , Rfl:     omeprazole (priLOSEC) 20 MG capsule, Take 1 capsule by mouth Daily., Disp: ,  Rfl:     Probiotic Product capsule, Take 1 tablet by mouth Daily., Disp: , Rfl:     spironolactone (ALDACTONE) 100 MG tablet, Take 1 tablet by mouth Daily., Disp: , Rfl:     traMADol (Ultram) 50 MG tablet, Take 1 tablet by mouth Every 6 (Six) Hours As Needed for Moderate Pain., Disp: 8 tablet, Rfl: 0    vitamin D (ERGOCALCIFEROL) 1.25 MG (72943 UT) capsule capsule, Take 1 capsule by mouth Every 7 (Seven) Days. , Disp: , Rfl:      Laboratory Values:    Labs from 11/10/2023 reviewed     Review of Systems:   Influenza-like illness: no fever, no  cough, no  sore throat, no  body aches, no loss of sense of taste or smell, no known exposure to person with Covid-19.  Constitutional: Negative for fevers or chills  HENT: Negative for hearing loss or runny nose  Eyes: Negative for vision changes or scleral icterus  Respiratory: Negative for cough or shortness of breath  Cardiovascular: Negative for chest pain or heart palpitations  Gastrointestinal: Negative for abdominal pain, nausea, vomiting, constipation, melena, or hematochezia  Genitourinary: Negative for hematuria or dysuria  Musculoskeletal: Negative for joint swelling or gait instability  Neurologic: Negative for tremors or seizures  Psychiatric: Negative for suicidal ideations or depression  All other systems reviewed and negative     Physical Exam:   ECO - Asymptomatic  Constitutional: Well-developed well-nourished, no acute distress  Eyes: Conjunctiva normal, sclera nonicteric  ENMT: Hearing grossly normal, oral mucosa moist  Neck: Supple, no palpable mass, trachea midline  Respiratory: Clear to auscultation, normal inspiratory effort  Cardiovascular: Regular rate, no peripheral edema, no jugular venous distention  Breast: symmetric,  Right: No visible abnormalities on inspection while seated, with arms raised or hands on hips. No masses, skin changes, or nipple abnormalities.  Left: No visible abnormalities on inspection while seated, with arms raised  or hands on hips. No masses, skin changes, or nipple abnormalities. Left upper outer quadrant breast incision well-healed with no skin changes or masses palpable.  No clinical chest wall involvement.  Gastrointestinal: Soft, nontender  Lymphatics (palpable nodes): No cervical, supraclavicular or axillary lymphadenopathy  Skin:  Warm, dry, no rash on visualized skin surfaces  Musculoskeletal: Symmetric strength, normal gait  Psychiatric: Alert and oriented ×3, normal affect      GERARD PINEDA M.D.  General and Endoscopic Surgery  [facility], [place], operated by [facility]     [address] [place] [address], [address]  [place], [place], [address]  P: [phone]  F: [phone]

## 2024-01-19 ENCOUNTER — APPOINTMENT (OUTPATIENT)
Dept: WOMENS IMAGING | Facility: HOSPITAL | Age: 49
End: 2024-01-19
Payer: COMMERCIAL

## 2024-01-19 PROCEDURE — 77063 BREAST TOMOSYNTHESIS BI: CPT | Performed by: RADIOLOGY

## 2024-01-19 PROCEDURE — 77067 SCR MAMMO BI INCL CAD: CPT | Performed by: RADIOLOGY

## 2024-11-14 ENCOUNTER — TELEPHONE (OUTPATIENT)
Dept: RADIATION ONCOLOGY | Facility: HOSPITAL | Age: 49
End: 2024-11-14
Payer: COMMERCIAL

## 2024-11-15 ENCOUNTER — OFFICE VISIT (OUTPATIENT)
Dept: RADIATION ONCOLOGY | Facility: HOSPITAL | Age: 49
End: 2024-11-15
Payer: COMMERCIAL

## 2024-11-15 VITALS
WEIGHT: 137 LBS | SYSTOLIC BLOOD PRESSURE: 110 MMHG | DIASTOLIC BLOOD PRESSURE: 71 MMHG | OXYGEN SATURATION: 99 % | BODY MASS INDEX: 23.5 KG/M2 | HEART RATE: 79 BPM

## 2024-11-15 DIAGNOSIS — D05.12 DUCTAL CARCINOMA IN SITU (DCIS) OF LEFT BREAST: Primary | ICD-10-CM

## 2024-11-15 NOTE — PROGRESS NOTES
[facility] Radiation Oncology   Follow Up    Chief Complaint  DCIS Left Breast        Diagnosis: DCIS Left Breast     Overall Stage 0     pTis: per lumpectomy pathology  cN0: per physical exam, mammography, and MRI breast  cM0: per physical exam        Radiation Completion Date: 5/22/2023        Prescription:      Sequential     Site: Left Breast  Laterality: Left  Total Dose: 4240cGy  Dose per Fraction: 265cGy  Total Fractions: 16  Daily or BID: Daily  Modality: Photon  Technique: 3DCRT  Bolus: No         THEN        2.   Site: Lumpectomy Cavity Boost  Laterality: Left  Total Dose: 1000cGy  Dose per Fraction: 250cGy  Total Fractions: 4  Daily or BID: Daily  Modality: Photon  Technique: 3DCRT  Bolus: No        Interval History:    Ariana Zapata presents for regularly scheduled follow-up approximately 18 months after completion of radiation therapy for ER/MD negative DCIS of the left breast.  The patient tolerated treatment fairly well and has done well in the interim.  She does not follow with medical oncology.  She saw Dr. Hickman earlier this year with no concerning findings.  Her last mammogram was in January and has not yet been ordered for repeat.      Imaging:      Mammogram 1/19/2024          Pathology:      No new relevant pathology      Labs:    Lab Results   Component Value Date    CREATININE 0.74 11/10/2023             Problem List:  Patient Active Problem List   Diagnosis    Ductal carcinoma in situ (DCIS) of left breast          Medications:  Current Outpatient Medications on File Prior to Visit   Medication Sig Dispense Refill    amphetamine-dextroamphetamine XR (ADDERALL XR) 20 MG 24 hr capsule Take 1 capsule by mouth Every Morning      levothyroxine (SYNTHROID, LEVOTHROID) 200 MCG tablet Take 1 tablet by mouth Daily.      Linzess 290 MCG capsule capsule Take 1 capsule by mouth Daily.      mometasone (ELOCON) 0.1 % ointment Apply 1 application topically to the appropriate area as directed Daily. 45 g 1  "   omeprazole (priLOSEC) 20 MG capsule Take 1 capsule by mouth Daily.      Probiotic Product capsule Take 1 tablet by mouth Daily.      spironolactone (ALDACTONE) 100 MG tablet Take 1 tablet by mouth Daily.      tiZANidine (ZANAFLEX) 4 MG tablet Take 1 tablet by mouth Every 6 (Six) Hours As Needed.      vitamin D (ERGOCALCIFEROL) 1.25 MG (87284 UT) capsule capsule Take 1 capsule by mouth Every 7 (Seven) Days. TUESDAYS       No current facility-administered medications on file prior to visit.          Allergies:  Allergies   Allergen Reactions    Amoxicillin-Pot Clavulanate Nausea And Vomiting    Codeine Nausea And Vomiting         Vital Signs:  /71   Pulse 79   Wt 62.1 kg (137 lb)   SpO2 99%   BMI 23.50 kg/m²   Estimated body mass index is 23.5 kg/m² as calculated from the following:    Height as of 1/15/24: 162.6 cm (64.02\").    Weight as of this encounter: 62.1 kg (137 lb).  Pain Score    11/15/24 1314   PainSc: 0-No pain         ECOG: Fully active, able to carry on all pre-disease performance without restriction = 0    Physical Exam  Vitals reviewed.   Constitutional:       General: She is not in acute distress.     Appearance: Normal appearance.   HENT:      Head: Normocephalic and atraumatic.   Eyes:      Extraocular Movements: Extraocular movements intact.      Pupils: Pupils are equal, round, and reactive to light.   Pulmonary:      Effort: Pulmonary effort is normal.   Abdominal:      General: Abdomen is flat.      Palpations: Abdomen is soft.   Musculoskeletal:      Cervical back: Normal range of motion.   Skin:     General: Skin is warm and dry.   Neurological:      General: No focal deficit present.      Mental Status: She is alert and oriented to person, place, and time.   Psychiatric:         Mood and Affect: Mood normal.         Behavior: Behavior normal.        Left Breast: Well-healed lumpectomy cavity scar.  No concerning breast findings or axillary lymphadenopathy.  No nipple discharge or " inversion.  Right Breast: Normal breast exam, no concerning nodules or axillary lymphadenopathy, no nipple discharge or inversion      Result Review :  The following data was reviewed by: Chet Andrade MD on 11/15/2024:  Labs: Last Creatinine   Data reviewed : Radiologic studies Mammogram             Diagnoses and all orders for this visit:    1. Ductal carcinoma in situ (DCIS) of left breast (Primary)        Assessment:    Ariana Zapata presents for regularly scheduled follow-up approximately 18 months after completion of radiation therapy for ER/CT negative DCIS of the left breast.  The patient tolerated treatment fairly well and has done well in the interim.  She does not follow with medical oncology.  She saw Dr. Hickman earlier this year with no concerning findings.  Her last mammogram was in January and has not yet been ordered for repeat.    I performed a detailed breast exam and did not have any concerning findings.  We will get her next annual mammogram ordered.  She can follow-up with me in 1 year.      Plan:    - Follow-up in 1 year  - Repeat mammogram in January 2025       I spent 20 minutes caring for Ariana on this date of service. This time includes time spent by me in the following activities:preparing for the visit, reviewing tests, obtaining and/or reviewing a separately obtained history, documenting information in the medical record, independently interpreting results and communicating that information with the patient/family/caregiver, and care coordination  Follow Up   No follow-ups on file.  Patient was given instructions and counseling regarding her condition or for health maintenance advice. Please see specific information pulled into the AVS if appropriate.     Chet Andrade MD

## 2024-12-18 ENCOUNTER — TELEPHONE (OUTPATIENT)
Dept: SURGERY | Facility: CLINIC | Age: 49
End: 2024-12-18
Payer: COMMERCIAL

## 2024-12-18 NOTE — TELEPHONE ENCOUNTER
Attempted to reach patient to inform her that her appointment on 1/17 with  has been moved to 12:30 due to her being in the OR. Anyone can relay this info.

## 2024-12-20 ENCOUNTER — TRANSCRIBE ORDERS (OUTPATIENT)
Dept: ADMINISTRATIVE | Facility: HOSPITAL | Age: 49
End: 2024-12-20
Payer: COMMERCIAL

## 2024-12-20 DIAGNOSIS — E04.1 THYROID NODULE: Primary | ICD-10-CM

## 2025-01-27 ENCOUNTER — TELEPHONE (OUTPATIENT)
Dept: RADIATION ONCOLOGY | Facility: HOSPITAL | Age: 50
End: 2025-01-27
Payer: COMMERCIAL

## 2025-01-27 NOTE — TELEPHONE ENCOUNTER
Called pt to get her scheduling preferences for a mammogram at [facility], as it is overdue. Pt did not answer, NURYSM.

## 2025-01-28 ENCOUNTER — APPOINTMENT (OUTPATIENT)
Dept: WOMENS IMAGING | Facility: HOSPITAL | Age: 50
End: 2025-01-28
Payer: COMMERCIAL

## 2025-01-28 PROCEDURE — 77063 BREAST TOMOSYNTHESIS BI: CPT | Performed by: RADIOLOGY

## 2025-01-28 PROCEDURE — 77067 SCR MAMMO BI INCL CAD: CPT | Performed by: RADIOLOGY

## 2025-01-31 ENCOUNTER — OFFICE VISIT (OUTPATIENT)
Dept: SURGERY | Facility: CLINIC | Age: 50
End: 2025-01-31
Payer: COMMERCIAL

## 2025-01-31 VITALS
OXYGEN SATURATION: 97 % | BODY MASS INDEX: 24.01 KG/M2 | DIASTOLIC BLOOD PRESSURE: 72 MMHG | SYSTOLIC BLOOD PRESSURE: 114 MMHG | HEIGHT: 64 IN | WEIGHT: 140.6 LBS | HEART RATE: 92 BPM

## 2025-01-31 DIAGNOSIS — R92.8 ABNORMAL MAMMOGRAM: ICD-10-CM

## 2025-01-31 DIAGNOSIS — Z85.3 HISTORY OF BREAST CANCER: Primary | ICD-10-CM

## 2025-01-31 PROCEDURE — 99213 OFFICE O/P EST LOW 20 MIN: CPT | Performed by: STUDENT IN AN ORGANIZED HEALTH CARE EDUCATION/TRAINING PROGRAM

## 2025-01-31 RX ORDER — NITROFURANTOIN 25; 75 MG/1; MG/1
100 CAPSULE ORAL
COMMUNITY
Start: 2025-01-29 | End: 2025-02-05

## 2025-01-31 RX ORDER — METRONIDAZOLE 500 MG/1
500 TABLET ORAL
COMMUNITY
Start: 2025-01-29 | End: 2025-02-05

## 2025-01-31 NOTE — PROGRESS NOTES
General Surgery Breast Cancer History and Physical Exam      Summary:    Ariana Zapata is a 48 y.o. 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       A multidisciplinary plan has been formulated for the patient:    (1) Breast Surgical Oncology:  -Invitae 47 panel genetic testing: negative   -S/p left breast wire localized lumpectomy 3/2023.   -Annual mammogram 1/28/2025. Diagnostic mammo needs to be scheduled.    -Follow up in 1 year.     (2) Medical Oncology:  -Follows with Dr. Tse. No plans for endocrine therapy.  Does not need to follow-up further.     (3) Radiation Oncology:  -Completed radiation therapy with Dr. Andrade.     Referring Provider: No ref. provider found     Chief Complaint: abnormal breast imaging     History of Present Illness: Ms. Ariana Zapata is a 48 y.o. year old lady, seen at the request of No ref. provider found for a new diagnosis of left breast cancer.       This was initially detected as an imaging abnormality. She has had annual mammograms each year. She had a prior right breast biopsy in [facility] (2004) that was benign. She then had another breast biopsy on the [facility] that was benign. Her work-up is detailed in the oncologic history below.      She denies any  breast lumps, pain, skin changes, or nipple discharge. She has a family history of breast cancer in a great grandmother and her mother at age 42. She denies any family history of ovarian cancer.      4/12/2023 she presents today for follow-up.  She is overall doing well.  Her pain from surgery has subsided but she does complain of some pain in her inframammary line far from her incision site.  She is complaining of quite a bit of fatigue that she believes is related to stopping her hormone therapy.    1/15/2024 She presents today for yearly follow up.  She has done well since I last saw her.  She completed radiation therapy with no issues.  She is not on  any endocrine therapy.  She has a mammogram due this week.  She complains of some pain and soreness near her inframammary fold in the lateral left breast but otherwise has no complaints.  She did just start vitamin E.    1/31/2025 She presents today for follow up.  She has done well since I last saw her.  She has no concerns with her breast exam.  She denies masses, skin changes, or nipple discharge.  She is up-to-date with her primary care, GYN, and colonoscopy.  She has declined MRI for dense breast tissue.     Workup of Current Diagnosis:    12/13/2022 bilateral Screening Mammogram:  Finding 1: There are new calcifications seen in the posterior one third upper outer region of the left breast.  Require additional evaluation.  Finding 2: There is a focal asymmetry seen in the upper outer region of the left breast.  Requires additional evaluation.  BI-RADS Category 0     1/19/2023 left breast diagnostic Mammogram with Ultrasound:   Finding 1: On present exam there is a focal asymmetry in the upper outer region of the left breast 5 cm from the nipple.  On ultrasound there is an irregular parallel hypoechoic mass with angular margins measuring 6 x 7 x 10 mm in the upper outer region of the left breast 5 cm from the nipple.  Suspicious.  Ultrasound-guided biopsy is recommended.  Finding 2: On present exam there are 3 groups of coarse heterogeneous calcifications with grouped distribution in the posterior one third upper outer region of the left breast.  Suspicious.  Stereotactic biopsy is recommended.  2 site stereotactic biopsy is recommended.  BI-RADS Category 4B     2/20/2023 left breast ultrasound-guided biopsy:   Left breast at 1:00 was imaged and the mass was localized.  15 cores were obtained.  A mini cork tissue marker was placed.  Clip was in the expected position.  Pathology returned benign and concordant.     2/20/2023 left breast stereotactic biopsy:  The left breast in the upper outer quadrant was isolated  and the calcifications were localized.  12 core specimens were obtained.  A Top-Hat clip was placed.  Clip was in the expected position.  Pathology is benign and concordant     2/20/2023 Pathology:   1. Site A, left breast, upper outer quadrant, stereotactic biopsy:   Benign fibrofatty breast tissue  2.  Site B, left breast, upper outer quadrant, stereotactic biopsy:  DCIS, grade 3  3.  Mass, left breast tissue at 1:00, upper outer quadrant, ultrasound biopsy:  Benign fibrofatty breast tissue     3/2/2023 Bilateral Breast MRI   IMPRESSION:  1. Biopsy-proven site of DCIS in the posterior one-third upper-outer quadrant of the left breast represented by the hourglass-shaped metallic clip. No suspicious enhancement is seen at this location or in other areas of the left breast. There is no evidence for left axillary adenopathy. If breast conservation therapy is desired then surgical excision of any remaining suspicious microcalcifications is recommended.  2. There are no findings suspicious for malignancy in the right breast.  BI-RADS Category 6: Known biopsy-proven malignancy.     3/24/2023 Left breast wire localized lumpectomy  Final Diagnosis   1. Breast, Left, Lumpectomy (10 grams):               A. Benign breast parenchyma with fibroadenomatoid hyperplasia.               B. Top hat clip and biopsy site change identified.      2. Breast, Left Additional Superior Margin, Inked and Oriented Excision:               A. Benign fibroadipose tissue.     3. Breast, Left Additional Medial Margin, Inked and Oriented Excision:                 A. Benign breast parenchyma with fibroadenomatoid change.     4. Breast, Left Additional Lateral Margin, Inked and Oriented Excision:               A. Benign breast parenchyma.     5. Breast, Left Additional Inferior Margin, Inked and Oriented Excision:               A. Benign breast parenchyma with ductal hyperplasia of the usual type.     6. Breast, Left Additional Posterior Margin,  Inked and Oriented Excision:               A. High grade ductal carcinoma in-situ (DCIS), 1.1 mm maximally, margin free. See synoptic template.               B. Hourglass clip and biopsy site change identified.               C. Clustered microcysts, columnar cell change and ductal hyperplasia of the usual type.               D. Scattered microcalcifications identified.               E. Benign skeletal muscle.     7. Breast, Left Additional Anterior Margin, Inked and Oriented Excision:               A. Benign breast parenchyma with fibroadenomatoid change.      10/2/2023 Left Breast US:   There is a hypoechoic area and biopsy clip seen in the left breast at 1:00 5cm FN. This smaller than the pre-biopsy images and is at the location of the prior benign biopsy. This is in close proximity to the interval lumpectomy. Hypoechoic area and biopsy clip in the left breast is benign-negative.   BIRADS Category 2    2025 bilateral screening mammogram:  The breasts are heterogeneously dense.  Finding 1: There is stable postsurgical scar with associated skin thickening and architectural distortion in the posterior one third 130 o'clock region of the left breast in the area of prior lumpectomy with metallic surgical clips noted.  No significant change.  Benign negative.  Finding 2: There are new coarse heterogeneous calcifications with grouped distribution in the middle one third 130 o'clock region of the right breast.  Requires additional evaluation.  BI-RADS Category 0    Gynecologic History:   . P:0 AB:0  Age at first childbirth: N/A  Lactation/How long: N/A  Age at menarche: 11-12  Age at menopause: 45  Total years of oral contraceptive use: 20 years previously  Total years of hormone replacement therapy: none     Past Medical History:   Hypothyroidism   GERD     Past Surgical History:    Colonoscopy up to date  Cholecystectomy   D&C  Ovarian cystectomy     Family History:    As above     Social History:  Denies  tobacco use  Occasional alcohol use     Allergies:        Allergies   Allergen Reactions    Amoxicillin-Pot Clavulanate Nausea And Vomiting    Codeine Nausea And Vomiting      Medications:      Current Outpatient Medications:     amphetamine-dextroamphetamine XR (ADDERALL XR) 20 MG 24 hr capsule, Take 1 capsule by mouth Every Morning TO HOLD 2 DAYS PRIOR TO OR, Disp: , Rfl:     Chlorhexidine Gluconate Cloth 2 % pads, Apply  topically. PRIOR TO OR, Disp: , Rfl:     levothyroxine (SYNTHROID, LEVOTHROID) 200 MCG tablet, Take 1 tablet by mouth Daily., Disp: , Rfl:     Linzess 290 MCG capsule capsule, Take 1 capsule by mouth Daily., Disp: , Rfl:     omeprazole (priLOSEC) 20 MG capsule, Take 1 capsule by mouth Daily., Disp: , Rfl:     Probiotic Product capsule, Take 1 tablet by mouth Daily., Disp: , Rfl:     spironolactone (ALDACTONE) 100 MG tablet, Take 1 tablet by mouth Daily., Disp: , Rfl:     traMADol (Ultram) 50 MG tablet, Take 1 tablet by mouth Every 6 (Six) Hours As Needed for Moderate Pain., Disp: 8 tablet, Rfl: 0    vitamin D (ERGOCALCIFEROL) 1.25 MG (50430 UT) capsule capsule, Take 1 capsule by mouth Every 7 (Seven) Days. TUESDAYS, Disp: , Rfl:      Laboratory Values:    Labs from 12/16/2024 reviewed     Review of Systems:   Influenza-like illness: no fever, no  cough, no  sore throat, no  body aches, no loss of sense of taste or smell, no known exposure to person with Covid-19.  Constitutional: Negative for fevers or chills  HENT: Negative for hearing loss or runny nose  Eyes: Negative for vision changes or scleral icterus  Respiratory: Negative for cough or shortness of breath  Cardiovascular: Negative for chest pain or heart palpitations  Gastrointestinal: Negative for abdominal pain, nausea, vomiting, constipation, melena, or hematochezia  Genitourinary: Negative for hematuria or dysuria  Musculoskeletal: Negative for joint swelling or gait instability  Neurologic: Negative for tremors or  seizures  Psychiatric: Negative for suicidal ideations or depression  All other systems reviewed and negative     Physical Exam:   ECO - Asymptomatic  Constitutional: Well-developed well-nourished, no acute distress  Eyes: Conjunctiva normal, sclera nonicteric  ENMT: Hearing grossly normal, oral mucosa moist  Neck: Supple, no palpable mass, trachea midline  Respiratory: Clear to auscultation, normal inspiratory effort  Cardiovascular: Regular rate, no peripheral edema, no jugular venous distention  Breast: symmetric,  Right: No visible abnormalities on inspection while seated, with arms raised or hands on hips. No masses, skin changes, or nipple abnormalities.  Left: No visible abnormalities on inspection while seated, with arms raised or hands on hips. No masses, skin changes, or nipple abnormalities. Left upper outer quadrant breast incision well-healed with no skin changes or masses palpable.  No clinical chest wall involvement.  Gastrointestinal: Soft, nontender  Lymphatics (palpable nodes): No cervical, supraclavicular or axillary lymphadenopathy  Skin:  Warm, dry, no rash on visualized skin surfaces  Musculoskeletal: Symmetric strength, normal gait  Psychiatric: Alert and oriented ×3, normal affect      GERARD PINEDA M.D.  General and Endoscopic Surgery  [facility]     [address], [address]  [place], [place], [address]  P: [phone]  F: [phone]

## 2025-02-21 ENCOUNTER — APPOINTMENT (OUTPATIENT)
Dept: WOMENS IMAGING | Facility: HOSPITAL | Age: 50
End: 2025-02-21
Payer: COMMERCIAL

## 2025-02-21 PROCEDURE — 77061 BREAST TOMOSYNTHESIS UNI: CPT | Performed by: RADIOLOGY

## 2025-02-21 PROCEDURE — G0279 TOMOSYNTHESIS, MAMMO: HCPCS | Performed by: RADIOLOGY

## 2025-02-21 PROCEDURE — 77065 DX MAMMO INCL CAD UNI: CPT | Performed by: RADIOLOGY

## 2025-03-24 ENCOUNTER — APPOINTMENT (OUTPATIENT)
Dept: WOMENS IMAGING | Facility: HOSPITAL | Age: 50
End: 2025-03-24
Payer: COMMERCIAL

## 2025-03-24 ENCOUNTER — LAB REQUISITION (OUTPATIENT)
Dept: LAB | Facility: HOSPITAL | Age: 50
End: 2025-03-24
Payer: COMMERCIAL

## 2025-03-24 DIAGNOSIS — R92.1 MAMMOGRAPHIC CALCIFICATION FOUND ON DIAGNOSTIC IMAGING OF BREAST: ICD-10-CM

## 2025-03-24 PROCEDURE — A4648 IMPLANTABLE TISSUE MARKER: HCPCS | Performed by: RADIOLOGY

## 2025-03-24 PROCEDURE — 19081 BX BREAST 1ST LESION STRTCTC: CPT | Performed by: RADIOLOGY

## 2025-03-24 PROCEDURE — C1819 TISSUE LOCALIZATION-EXCISION: HCPCS | Performed by: RADIOLOGY

## 2025-03-24 PROCEDURE — 88305 TISSUE EXAM BY PATHOLOGIST: CPT | Performed by: STUDENT IN AN ORGANIZED HEALTH CARE EDUCATION/TRAINING PROGRAM

## 2025-03-24 PROCEDURE — 88341 IMHCHEM/IMCYTCHM EA ADD ANTB: CPT | Performed by: STUDENT IN AN ORGANIZED HEALTH CARE EDUCATION/TRAINING PROGRAM

## 2025-03-24 PROCEDURE — 88342 IMHCHEM/IMCYTCHM 1ST ANTB: CPT | Performed by: STUDENT IN AN ORGANIZED HEALTH CARE EDUCATION/TRAINING PROGRAM

## 2025-03-26 LAB
CYTO UR: NORMAL
DX PRELIMINARY: NORMAL
LAB AP CASE REPORT: NORMAL
LAB AP CLINICAL INFORMATION: NORMAL
LAB AP DIAGNOSIS COMMENT: NORMAL
LAB AP SPECIAL STAINS: NORMAL
PATH REPORT.FINAL DX SPEC: NORMAL
PATH REPORT.GROSS SPEC: NORMAL

## 2025-04-11 DIAGNOSIS — Z91.89 AT HIGH RISK FOR BREAST CANCER: Primary | ICD-10-CM

## (undated) DEVICE — SUT MNCRYL PLS ANTIB UD 4/0 PS2 18IN

## (undated) DEVICE — ANTIBACTERIAL UNDYED BRAIDED (POLYGLACTIN 910), SYNTHETIC ABSORBABLE SUTURE: Brand: COATED VICRYL

## (undated) DEVICE — TRAP FLD MINIVAC MEGADYNE 100ML

## (undated) DEVICE — GLV SURG BIOGEL LTX PF 6 1/2

## (undated) DEVICE — NDL HYPO ECLPS SFTY 22G 1 1/2IN

## (undated) DEVICE — ELECTRD BLD EZ CLN MOD XLNG 2.75IN

## (undated) DEVICE — LEGGINGS, PAIR, 31X48, STERILE: Brand: MEDLINE

## (undated) DEVICE — APPL CHLORAPREP HI/LITE 26ML ORNG

## (undated) DEVICE — PK CHST BRST 40

## (undated) DEVICE — SUT SILK 2/0 FS BLK 18IN 685G

## (undated) DEVICE — TBG PENCL TELESCP MEGADYNE SMOKE EVAC 10FT

## (undated) DEVICE — ADHS SKIN SURG TISS VISC PREMIERPRO EXOFIN HI/VISC FAST/DRY